# Patient Record
Sex: FEMALE | Race: WHITE | NOT HISPANIC OR LATINO | Employment: FULL TIME | URBAN - NONMETROPOLITAN AREA
[De-identification: names, ages, dates, MRNs, and addresses within clinical notes are randomized per-mention and may not be internally consistent; named-entity substitution may affect disease eponyms.]

---

## 2017-04-22 ENCOUNTER — APPOINTMENT (OUTPATIENT)
Dept: ULTRASOUND IMAGING | Facility: HOSPITAL | Age: 27
End: 2017-04-22

## 2017-04-22 ENCOUNTER — HOSPITAL ENCOUNTER (EMERGENCY)
Facility: HOSPITAL | Age: 27
Discharge: HOME | End: 2017-04-22
Attending: EMERGENCY MEDICINE | Admitting: EMERGENCY MEDICINE

## 2017-04-22 VITALS
BODY MASS INDEX: 25.78 KG/M2 | SYSTOLIC BLOOD PRESSURE: 117 MMHG | HEART RATE: 96 BPM | TEMPERATURE: 98.5 F | HEIGHT: 66 IN | WEIGHT: 160.4 LBS | OXYGEN SATURATION: 100 % | DIASTOLIC BLOOD PRESSURE: 64 MMHG | RESPIRATION RATE: 20 BRPM

## 2017-04-22 DIAGNOSIS — O20.9 BLEEDING IN EARLY PREGNANCY: Primary | ICD-10-CM

## 2017-04-22 LAB
ABO GROUP BLD: NORMAL
B-HCG UR QL: POSITIVE
BACTERIA UR QL AUTO: ABNORMAL /HPF
BILIRUB UR QL STRIP: NEGATIVE
CLARITY UR: ABNORMAL
COLOR UR: YELLOW
GLUCOSE UR STRIP-MCNC: NEGATIVE MG/DL
HGB UR QL STRIP.AUTO: ABNORMAL
HOLD SPECIMEN: NORMAL
HOLD SPECIMEN: NORMAL
HYALINE CASTS UR QL AUTO: ABNORMAL /LPF
KETONES UR QL STRIP: ABNORMAL
LEUKOCYTE ESTERASE UR QL STRIP.AUTO: NEGATIVE
NITRITE UR QL STRIP: NEGATIVE
PH UR STRIP.AUTO: 6.5 [PH] (ref 5–9)
PROT UR QL STRIP: NEGATIVE
RBC # UR: ABNORMAL /HPF
REF LAB TEST METHOD: ABNORMAL
RH BLD: POSITIVE
SP GR UR STRIP: 1.02 (ref 1–1.03)
SQUAMOUS #/AREA URNS HPF: ABNORMAL /HPF
UROBILINOGEN UR QL STRIP: ABNORMAL
WBC UR QL AUTO: ABNORMAL /HPF
WHOLE BLOOD HOLD SPECIMEN: NORMAL

## 2017-04-22 PROCEDURE — 86901 BLOOD TYPING SEROLOGIC RH(D): CPT | Performed by: EMERGENCY MEDICINE

## 2017-04-22 PROCEDURE — 81001 URINALYSIS AUTO W/SCOPE: CPT | Performed by: EMERGENCY MEDICINE

## 2017-04-22 PROCEDURE — 76801 OB US < 14 WKS SINGLE FETUS: CPT

## 2017-04-22 PROCEDURE — 87086 URINE CULTURE/COLONY COUNT: CPT | Performed by: EMERGENCY MEDICINE

## 2017-04-22 PROCEDURE — 86900 BLOOD TYPING SEROLOGIC ABO: CPT | Performed by: EMERGENCY MEDICINE

## 2017-04-22 PROCEDURE — 81025 URINE PREGNANCY TEST: CPT | Performed by: EMERGENCY MEDICINE

## 2017-04-22 PROCEDURE — 99283 EMERGENCY DEPT VISIT LOW MDM: CPT

## 2017-04-22 RX ORDER — PNV NO.95/FERROUS FUM/FOLIC AC 28MG-0.8MG
1 TABLET ORAL DAILY
Qty: 30 TABLET | Refills: 0 | Status: SHIPPED | OUTPATIENT
Start: 2017-04-22 | End: 2017-05-24 | Stop reason: SDUPTHER

## 2017-04-22 NOTE — ED PROVIDER NOTES
Subjective   Patient is a 26 y.o. female presenting with vaginal bleeding.   Vaginal Bleeding   Quality:  Spotting (Possible pregnancy, LMP mid February, now cramping/spotting/low back pain today.)  Severity:  Mild  Onset quality:  Sudden  Duration:  1 day  Timing:  Intermittent  Progression:  Partially resolved  Chronicity:  New  Menstrual history:  Missed period  Possible pregnancy: yes    Context: spontaneously    Relieved by:  Nothing  Worsened by:  Nothing  Ineffective treatments:  None tried  Associated symptoms: abdominal pain, back pain and nausea    Associated symptoms: no dizziness, no dysuria, no fatigue, no fever and no vaginal discharge    Risk factors: no hx of ectopic pregnancy, no PID and no STD        Review of Systems   Constitutional: Negative for chills, diaphoresis, fatigue and fever.   HENT: Negative for rhinorrhea and sore throat.    Eyes: Negative for pain, discharge and visual disturbance.   Respiratory: Negative for cough, chest tightness, shortness of breath and wheezing.    Cardiovascular: Negative for chest pain and leg swelling.   Gastrointestinal: Positive for abdominal pain and nausea. Negative for blood in stool, constipation, diarrhea and vomiting.   Endocrine: Negative for polydipsia and polyuria.   Genitourinary: Positive for vaginal bleeding. Negative for decreased urine volume, dysuria, flank pain, frequency, hematuria and vaginal discharge.   Musculoskeletal: Positive for back pain. Negative for myalgias.   Skin: Negative for rash.   Neurological: Negative for dizziness, syncope, speech difficulty, weakness, light-headedness and headaches.   Psychiatric/Behavioral: Negative for confusion and decreased concentration.   All other systems reviewed and are negative.      History reviewed. No pertinent past medical history.    No Known Allergies    History reviewed. No pertinent surgical history.    History reviewed. No pertinent family history.    Social History     Social History    • Marital status: Single     Spouse name: N/A   • Number of children: N/A   • Years of education: N/A     Social History Main Topics   • Smoking status: Former Smoker     Quit date: 4/20/2017   • Smokeless tobacco: None   • Alcohol use No   • Drug use: No   • Sexual activity: Not Asked     Other Topics Concern   • None     Social History Narrative   • None           Objective   Physical Exam   Constitutional: She appears well-developed and well-nourished.  Non-toxic appearance.   HENT:   Head: Normocephalic and atraumatic.   Nose: Nose normal.   Mouth/Throat: Oropharynx is clear and moist.   Eyes: Conjunctivae, EOM and lids are normal.   Neck: Neck supple. No tracheal deviation present.   Cardiovascular: Normal rate, regular rhythm, normal heart sounds and intact distal pulses.    No murmur heard.  Pulmonary/Chest: Effort normal and breath sounds normal. No stridor. No tachypnea. No respiratory distress. She has no wheezes. She has no rales.   Abdominal: Soft. Bowel sounds are normal. She exhibits no distension and no mass. There is no tenderness. There is no rebound and no guarding.   Musculoskeletal: She exhibits no edema.   Neurological: She is alert. No cranial nerve deficit. She exhibits normal muscle tone. Coordination normal.   Skin: Skin is warm and dry. No rash noted. No pallor.   Psychiatric: She has a normal mood and affect. Her behavior is normal. Judgment and thought content normal.   Nursing note and vitals reviewed.      Procedures         ED Course  ED Course                  MDM    Final diagnoses:   Bleeding in early pregnancy            Miguel Farmer MD  04/23/17 0140

## 2017-04-22 NOTE — ED NOTES
Pt presents to the ED with mild vaginal bleeding that started this morning.  Pt reports lower back pain and lower abdominal pain.  Pt reports that she found out last week that she was pregnant but unsure of how many weeks she was. Last normal period was sometime in February.      Latanya Elizondo RN  04/22/17 9025

## 2017-04-23 LAB — BACTERIA SPEC AEROBE CULT: NORMAL

## 2017-05-02 ENCOUNTER — APPOINTMENT (OUTPATIENT)
Dept: LAB | Facility: HOSPITAL | Age: 27
End: 2017-05-02

## 2017-05-02 ENCOUNTER — INITIAL PRENATAL (OUTPATIENT)
Dept: OBSTETRICS AND GYNECOLOGY | Facility: CLINIC | Age: 27
End: 2017-05-02

## 2017-05-02 VITALS — SYSTOLIC BLOOD PRESSURE: 125 MMHG | WEIGHT: 157 LBS | BODY MASS INDEX: 25.34 KG/M2 | DIASTOLIC BLOOD PRESSURE: 80 MMHG

## 2017-05-02 DIAGNOSIS — O20.9 BLEEDING IN EARLY PREGNANCY: Primary | ICD-10-CM

## 2017-05-02 DIAGNOSIS — Z3A.09 9 WEEKS GESTATION OF PREGNANCY: ICD-10-CM

## 2017-05-02 LAB
ABO GROUP BLD: NORMAL
BASOPHILS # BLD AUTO: 0.03 10*3/MM3 (ref 0–0.2)
BASOPHILS NFR BLD AUTO: 0.3 % (ref 0–2)
BLD GP AB SCN SERPL QL: NEGATIVE
CANDIDA ALBICANS: NEGATIVE
DEPRECATED RDW RBC AUTO: 36 FL (ref 36.4–46.3)
EOSINOPHIL # BLD AUTO: 0.14 10*3/MM3 (ref 0–0.7)
EOSINOPHIL NFR BLD AUTO: 1.5 % (ref 0–7)
ERYTHROCYTE [DISTWIDTH] IN BLOOD BY AUTOMATED COUNT: 11.7 % (ref 11.5–14.5)
GARDNERELLA VAGINALIS: NEGATIVE
HBV SURFACE AG SERPL QL IA: NEGATIVE
HCT VFR BLD AUTO: 37.9 % (ref 35–45)
HCV AB SER DONR QL: NEGATIVE
HGB BLD-MCNC: 13.5 G/DL (ref 12–15.5)
HIV1+2 AB SER QL: NEGATIVE
IMM GRANULOCYTES # BLD: 0.02 10*3/MM3 (ref 0–0.02)
IMM GRANULOCYTES NFR BLD: 0.2 % (ref 0–0.5)
LYMPHOCYTES # BLD AUTO: 1.48 10*3/MM3 (ref 0.6–4.2)
LYMPHOCYTES NFR BLD AUTO: 16.1 % (ref 10–50)
MCH RBC QN AUTO: 30 PG (ref 26.5–34)
MCHC RBC AUTO-ENTMCNC: 35.6 G/DL (ref 31.4–36)
MCV RBC AUTO: 84.2 FL (ref 80–98)
MONOCYTES # BLD AUTO: 0.38 10*3/MM3 (ref 0–0.9)
MONOCYTES NFR BLD AUTO: 4.1 % (ref 0–12)
NEUTROPHILS # BLD AUTO: 7.14 10*3/MM3 (ref 2–8.6)
NEUTROPHILS NFR BLD AUTO: 77.8 % (ref 37–80)
PLATELET # BLD AUTO: 185 10*3/MM3 (ref 150–450)
PMV BLD AUTO: 11.7 FL (ref 8–12)
RBC # BLD AUTO: 4.5 10*6/MM3 (ref 3.77–5.16)
RH BLD: POSITIVE
RUBV IGG SER QL: ABNORMAL
RUBV IGG SER-ACNC: 172 IU/ML (ref 0–9.9)
TRICHOMONAS VAGINALIS PCR: NEGATIVE
WBC NRBC COR # BLD: 9.19 10*3/MM3 (ref 3.2–9.8)

## 2017-05-02 PROCEDURE — 86850 RBC ANTIBODY SCREEN: CPT | Performed by: ADVANCED PRACTICE MIDWIFE

## 2017-05-02 PROCEDURE — 86900 BLOOD TYPING SEROLOGIC ABO: CPT | Performed by: ADVANCED PRACTICE MIDWIFE

## 2017-05-02 PROCEDURE — G0432 EIA HIV-1/HIV-2 SCREEN: HCPCS | Performed by: ADVANCED PRACTICE MIDWIFE

## 2017-05-02 PROCEDURE — 86901 BLOOD TYPING SEROLOGIC RH(D): CPT | Performed by: ADVANCED PRACTICE MIDWIFE

## 2017-05-02 PROCEDURE — 86803 HEPATITIS C AB TEST: CPT | Performed by: ADVANCED PRACTICE MIDWIFE

## 2017-05-02 PROCEDURE — 87660 TRICHOMONAS VAGIN DIR PROBE: CPT | Performed by: ADVANCED PRACTICE MIDWIFE

## 2017-05-02 PROCEDURE — 0501F PRENATAL FLOW SHEET: CPT | Performed by: ADVANCED PRACTICE MIDWIFE

## 2017-05-02 PROCEDURE — 87480 CANDIDA DNA DIR PROBE: CPT | Performed by: ADVANCED PRACTICE MIDWIFE

## 2017-05-02 PROCEDURE — 87510 GARDNER VAG DNA DIR PROBE: CPT | Performed by: ADVANCED PRACTICE MIDWIFE

## 2017-05-02 PROCEDURE — 36415 COLL VENOUS BLD VENIPUNCTURE: CPT | Performed by: ADVANCED PRACTICE MIDWIFE

## 2017-05-02 PROCEDURE — 85025 COMPLETE CBC W/AUTO DIFF WBC: CPT | Performed by: ADVANCED PRACTICE MIDWIFE

## 2017-05-02 PROCEDURE — 87340 HEPATITIS B SURFACE AG IA: CPT | Performed by: ADVANCED PRACTICE MIDWIFE

## 2017-05-03 PROBLEM — B00.9 HERPES: Status: ACTIVE | Noted: 2017-05-03

## 2017-05-03 LAB — Lab: NORMAL

## 2017-05-08 LAB — RPR SER QL: NORMAL

## 2017-05-24 RX ORDER — PNV NO.95/FERROUS FUM/FOLIC AC 28MG-0.8MG
1 TABLET ORAL DAILY
Qty: 30 TABLET | Refills: 12 | Status: SHIPPED | OUTPATIENT
Start: 2017-05-24 | End: 2017-11-30 | Stop reason: HOSPADM

## 2017-05-30 ENCOUNTER — ROUTINE PRENATAL (OUTPATIENT)
Dept: OBSTETRICS AND GYNECOLOGY | Facility: CLINIC | Age: 27
End: 2017-05-30

## 2017-05-30 ENCOUNTER — APPOINTMENT (OUTPATIENT)
Dept: LAB | Facility: HOSPITAL | Age: 27
End: 2017-05-30

## 2017-05-30 VITALS — DIASTOLIC BLOOD PRESSURE: 72 MMHG | BODY MASS INDEX: 25.34 KG/M2 | SYSTOLIC BLOOD PRESSURE: 112 MMHG | WEIGHT: 157 LBS

## 2017-05-30 DIAGNOSIS — Z3A.13 13 WEEKS GESTATION OF PREGNANCY: ICD-10-CM

## 2017-05-30 DIAGNOSIS — Z34.01 ENCOUNTER FOR SUPERVISION OF NORMAL FIRST PREGNANCY IN FIRST TRIMESTER: Primary | ICD-10-CM

## 2017-05-30 LAB
AMPHET+METHAMPHET UR QL: NEGATIVE
BACTERIA UR QL AUTO: ABNORMAL /HPF
BARBITURATES UR QL SCN: NEGATIVE
BENZODIAZ UR QL SCN: NEGATIVE
BILIRUB UR QL STRIP: NEGATIVE
CANNABINOIDS SERPL QL: NEGATIVE
CLARITY UR: ABNORMAL
COCAINE UR QL: NEGATIVE
COLOR UR: ABNORMAL
GLUCOSE UR STRIP-MCNC: NEGATIVE MG/DL
HGB UR QL STRIP.AUTO: NEGATIVE
HYALINE CASTS UR QL AUTO: ABNORMAL /LPF
KETONES UR QL STRIP: NEGATIVE
LEUKOCYTE ESTERASE UR QL STRIP.AUTO: ABNORMAL
METHADONE UR QL SCN: NEGATIVE
NITRITE UR QL STRIP: NEGATIVE
OPIATES UR QL: NEGATIVE
OXYCODONE UR QL SCN: NEGATIVE
PH UR STRIP.AUTO: 6 [PH] (ref 5–9)
PROT UR QL STRIP: NEGATIVE
RBC # UR: ABNORMAL /HPF
REF LAB TEST METHOD: ABNORMAL
SP GR UR STRIP: 1.02 (ref 1–1.03)
SQUAMOUS #/AREA URNS HPF: ABNORMAL /HPF
UROBILINOGEN UR QL STRIP: ABNORMAL
WBC UR QL AUTO: ABNORMAL /HPF
YEAST URNS QL MICRO: ABNORMAL /HPF

## 2017-05-30 PROCEDURE — 80307 DRUG TEST PRSMV CHEM ANLYZR: CPT | Performed by: ADVANCED PRACTICE MIDWIFE

## 2017-05-30 PROCEDURE — 87591 N.GONORRHOEAE DNA AMP PROB: CPT | Performed by: ADVANCED PRACTICE MIDWIFE

## 2017-05-30 PROCEDURE — 0502F SUBSEQUENT PRENATAL CARE: CPT | Performed by: ADVANCED PRACTICE MIDWIFE

## 2017-05-30 PROCEDURE — 87491 CHLMYD TRACH DNA AMP PROBE: CPT | Performed by: ADVANCED PRACTICE MIDWIFE

## 2017-05-30 PROCEDURE — 87086 URINE CULTURE/COLONY COUNT: CPT | Performed by: ADVANCED PRACTICE MIDWIFE

## 2017-05-30 PROCEDURE — 81001 URINALYSIS AUTO W/SCOPE: CPT | Performed by: ADVANCED PRACTICE MIDWIFE

## 2017-05-31 PROBLEM — Z34.01 ENCOUNTER FOR SUPERVISION OF NORMAL FIRST PREGNANCY IN FIRST TRIMESTER: Status: ACTIVE | Noted: 2017-05-31

## 2017-05-31 LAB
BACTERIA SPEC AEROBE CULT: NORMAL
C TRACH RRNA CVX QL NAA+PROBE: NOT DETECTED
N GONORRHOEA RRNA SPEC QL NAA+PROBE: NOT DETECTED

## 2017-06-22 ENCOUNTER — ROUTINE PRENATAL (OUTPATIENT)
Dept: OBSTETRICS AND GYNECOLOGY | Facility: CLINIC | Age: 27
End: 2017-06-22

## 2017-06-22 VITALS — BODY MASS INDEX: 27.12 KG/M2 | WEIGHT: 168 LBS | SYSTOLIC BLOOD PRESSURE: 113 MMHG | DIASTOLIC BLOOD PRESSURE: 81 MMHG

## 2017-06-22 DIAGNOSIS — Z3A.16 16 WEEKS GESTATION OF PREGNANCY: ICD-10-CM

## 2017-06-22 DIAGNOSIS — Z36.89 ENCOUNTER FOR ROUTINE FETAL ULTRASOUND: ICD-10-CM

## 2017-06-22 DIAGNOSIS — Z34.02 ENCOUNTER FOR SUPERVISION OF NORMAL FIRST PREGNANCY IN SECOND TRIMESTER: Primary | ICD-10-CM

## 2017-06-22 PROCEDURE — 0502F SUBSEQUENT PRENATAL CARE: CPT | Performed by: ADVANCED PRACTICE MIDWIFE

## 2017-06-24 PROBLEM — Z34.02 ENCOUNTER FOR SUPERVISION OF NORMAL FIRST PREGNANCY IN SECOND TRIMESTER: Status: ACTIVE | Noted: 2017-05-31

## 2017-06-24 NOTE — PROGRESS NOTES
Next visit: anatomy scan  Patient presents for CNT #1. ROS: reports headache occasionally. Denies spotting, dysuria, vomiting.    Educated on importance of hydration. Class reviewed overview of prenatal care and nutrition.     Patient was offered genetic screening today. Educated patient that this is not a diagnostic test. Therefore, a positive result does not indicate the fetus has the condition resulted, only that there is an increased risk. Discussed referral to Maternal Fetal Medicine of her choosing if result is positive. States understanding.     After considering the options previously presented, she is interested in having genetic testing performed.

## 2017-07-20 ENCOUNTER — APPOINTMENT (OUTPATIENT)
Dept: LAB | Facility: HOSPITAL | Age: 27
End: 2017-07-20

## 2017-07-20 ENCOUNTER — ROUTINE PRENATAL (OUTPATIENT)
Dept: OBSTETRICS AND GYNECOLOGY | Facility: CLINIC | Age: 27
End: 2017-07-20

## 2017-07-20 VITALS — BODY MASS INDEX: 27.76 KG/M2 | DIASTOLIC BLOOD PRESSURE: 86 MMHG | WEIGHT: 172 LBS | SYSTOLIC BLOOD PRESSURE: 123 MMHG

## 2017-07-20 DIAGNOSIS — Z3A.20 20 WEEKS GESTATION OF PREGNANCY: ICD-10-CM

## 2017-07-20 DIAGNOSIS — Z34.02 ENCOUNTER FOR SUPERVISION OF NORMAL FIRST PREGNANCY IN SECOND TRIMESTER: Primary | ICD-10-CM

## 2017-07-20 PROCEDURE — 36415 COLL VENOUS BLD VENIPUNCTURE: CPT

## 2017-07-20 PROCEDURE — 81220 CFTR GENE COM VARIANTS: CPT | Performed by: ADVANCED PRACTICE MIDWIFE

## 2017-07-20 PROCEDURE — 82677 ASSAY OF ESTRIOL: CPT | Performed by: ADVANCED PRACTICE MIDWIFE

## 2017-07-20 PROCEDURE — 82105 ALPHA-FETOPROTEIN SERUM: CPT | Performed by: ADVANCED PRACTICE MIDWIFE

## 2017-07-20 PROCEDURE — 84702 CHORIONIC GONADOTROPIN TEST: CPT | Performed by: ADVANCED PRACTICE MIDWIFE

## 2017-07-20 PROCEDURE — 99212 OFFICE O/P EST SF 10 MIN: CPT | Performed by: ADVANCED PRACTICE MIDWIFE

## 2017-07-20 PROCEDURE — 86336 INHIBIN A: CPT | Performed by: ADVANCED PRACTICE MIDWIFE

## 2017-07-20 NOTE — PROGRESS NOTES
CC: BRANDEN visit with anatomy scan, history reviewed see history tabs.  Did not collect genetic screening last visit but desires to do so today.    ROS: Negative headache, low back pain, vaginal bleeding and dysuria    Ultrasound: EFW 13 oz, BRAXTON 8.9cm, , cephalic, posterior placenta, 3 vessel cord, anatomy within normal limits, female.    Educated on: ultrasound report    Plan: f/u in 4 weeks

## 2017-07-25 LAB
2ND TRIMESTER 4 SCREEN SERPL-IMP: ABNORMAL
AFP ADJ MOM SERPL: 0.64
AFP INTERP SERPL-IMP: ABNORMAL
AFP SERPL-MCNC: 36.6 NG/ML
AGE AT DELIVERY: 26.2 YEARS
FET TS 18 RISK FROM MAT AGE: ABNORMAL
FET TS 21 RISK FROM MAT AGE: 971
GA METHOD: ABNORMAL
GA: 20.7 WEEKS
HCG ADJ MOM SERPL: 2.69
HCG SERPL-ACNC: ABNORMAL MIU/ML
IDDM PATIENT QL: NO
INHIBIN A ADJ MOM SERPL: 2.29
INHIBIN A SERPL-MCNC: 445.66 PG/ML
LABORATORY COMMENT REPORT: ABNORMAL
MULTIPLE PREGNANCY: NO
NEURAL TUBE DEFECT RISK FETUS: ABNORMAL %
RESULT: ABNORMAL
TS 18 RISK FETUS: ABNORMAL
TS 21 RISK FETUS: 71
U ESTRIOL ADJ MOM SERPL: 1.17
U ESTRIOL SERPL-MCNC: 2.32 NG/ML

## 2017-07-28 DIAGNOSIS — O28.5 ABNORMAL GENETIC TEST DURING PREGNANCY: Primary | ICD-10-CM

## 2017-07-28 LAB
CFTR MUT ANL BLD/T: NORMAL
CONV COMMENT: NORMAL

## 2017-08-01 DIAGNOSIS — O28.0 ABNORMAL QUAD SCREEN: Primary | ICD-10-CM

## 2017-08-09 ENCOUNTER — ROUTINE PRENATAL (OUTPATIENT)
Dept: OBSTETRICS AND GYNECOLOGY | Facility: CLINIC | Age: 27
End: 2017-08-09

## 2017-08-09 ENCOUNTER — CONSULT (OUTPATIENT)
Dept: OBSTETRICS AND GYNECOLOGY | Facility: CLINIC | Age: 27
End: 2017-08-09

## 2017-08-09 VITALS — SYSTOLIC BLOOD PRESSURE: 116 MMHG | BODY MASS INDEX: 29.05 KG/M2 | DIASTOLIC BLOOD PRESSURE: 80 MMHG | WEIGHT: 180 LBS

## 2017-08-09 DIAGNOSIS — Z36.9 ANTENATAL SCREENING ENCOUNTER: ICD-10-CM

## 2017-08-09 DIAGNOSIS — B00.9 HERPES: ICD-10-CM

## 2017-08-09 DIAGNOSIS — O28.0 ABNORMAL QUAD SCREEN: Primary | ICD-10-CM

## 2017-08-09 DIAGNOSIS — O28.0 ABNORMAL ANTENATAL AFP SCREEN: Primary | ICD-10-CM

## 2017-08-09 PROCEDURE — 99212 OFFICE O/P EST SF 10 MIN: CPT | Performed by: ADVANCED PRACTICE MIDWIFE

## 2017-08-09 NOTE — PROGRESS NOTES
CC: BRANDEN visit, history reviewed see history tabs.     ROS: Negative leaking fluid from the vagina, swelling in her legs, headache, visual changes, low back pain and heartburn      Educated on:1 hour glucose, reviewed US findings- normal. [facility] offered maternity T21 and amnio and patient declined     A/Plan: f/u in 1 week/s   Marlene was seen today for routine prenatal visit.    Diagnoses and all orders for this visit:     screening encounter  -     CBC & Differential; Future  -     Glucose, Post 50 Gm Glucola; Future    Needs f/u us at 32 weeks- abnormal quad

## 2017-08-10 PROBLEM — O28.0 ABNORMAL QUAD SCREEN: Status: ACTIVE | Noted: 2017-08-10

## 2017-08-10 NOTE — PROGRESS NOTES
Consultation requested on patient by referring doctor.    Thank you for requesting our service to provide consultation to your patient given her abnormal maternal serum screening test which indicated an increased risk for trisomy 21 syndrome. We discussed that maternal serum AFP (alpha-fetoprotein), uE3 (unconjugated estriol), and HCG (human chorionic gonadotropin) screening is a test for neural tube defects and some chromosome abnormalities such as trisomy 21 and trisomy 18 syndrome.  The patient's maternal serum screening result in combination with her age has increased the risk for trisomy 21 to 1 in 71.  The test detects about 80% of fetuses with trisomy 21 with a false positive rate of 6%.  The patient has been offered the option of pursuing amniocentesis should she wish to learn the fetal chromosome complement.  Amniocentesis has a 1 in 500 risk of associated miscarriage. The chromosome result is 99.5% accurate and requires approximately 7-10 days to return.  Amniotic fluid AFP measurement also detects 95% of open neural tube defects.  After reviewing the risks, benefits and limitations of amniocentesis, the patient decided not to have an amniocentesis.  I then discussed the option of analysis of cell-free fetal DNA in maternal blood.  I noted this directed analysis measures the relative proportion of chromosomes with the detection rate of fetal Down syndrome quoted as 99% with a false positive rate of 0.1%.      The ultrasound showed a normal appearing fetus.  None of the commonly seen anomalies of trisomy 21 syndrome (increased nuchal fold measurement, proximal extremity shortening or congenital heart defects) were seen (please see above complete ultrasound description).  She has been informed that these ultrasound findings reduce the fetal risk for trisomy 21 by 60% but cannot completely exclude chromosome anomalies.  She understands the counseling provided and again declines amniocentesis.  I have  recommended a follow-up ultrasound at 32 weeks' gestation for the later ultrasonographic findings of fetal aneuploidy.      After careful consideration, patient declines all genetic testing.    I spent a total time of  15 minutes with this patient of which greater than 50% was face to face counseling and coordination of care.  Questions asked by the patient were answered.

## 2017-08-17 ENCOUNTER — ROUTINE PRENATAL (OUTPATIENT)
Dept: OBSTETRICS AND GYNECOLOGY | Facility: CLINIC | Age: 27
End: 2017-08-17

## 2017-08-17 VITALS — WEIGHT: 186 LBS | DIASTOLIC BLOOD PRESSURE: 69 MMHG | SYSTOLIC BLOOD PRESSURE: 104 MMHG | BODY MASS INDEX: 30.02 KG/M2

## 2017-08-17 DIAGNOSIS — Z34.02 ENCOUNTER FOR SUPERVISION OF NORMAL FIRST PREGNANCY IN SECOND TRIMESTER: Primary | ICD-10-CM

## 2017-08-17 DIAGNOSIS — O28.0 ABNORMAL QUAD SCREEN: ICD-10-CM

## 2017-08-17 DIAGNOSIS — B00.9 HERPES: ICD-10-CM

## 2017-08-17 DIAGNOSIS — Z3A.24 24 WEEKS GESTATION OF PREGNANCY: ICD-10-CM

## 2017-08-17 NOTE — PROGRESS NOTES
CC: BRANDEN visit, history reviewed see history tabs.     ROS:   Negative leaking fluid from the vagina, swelling in her legs, headache, visual changes, low back pain and heartburn      Educated on:1 hour glucose test     A/Plan: f/u in 4 week/s   Marlene was seen today for routine prenatal visit.    Diagnoses and all orders for this visit:    Encounter for supervision of normal first pregnancy in second trimester  -     Glucose, Post 50 Gm Glucola; Future  -     CBC (No Diff); Future    Abnormal quad screen  -     Glucose, Post 50 Gm Glucola; Future  -     CBC (No Diff); Future    Herpes  -     Glucose, Post 50 Gm Glucola; Future  -     CBC (No Diff); Future    24 weeks gestation of pregnancy  -     Glucose, Post 50 Gm Glucola; Future  -     CBC (No Diff); Future

## 2017-08-22 ENCOUNTER — RESULTS ENCOUNTER (OUTPATIENT)
Dept: OBSTETRICS AND GYNECOLOGY | Facility: CLINIC | Age: 27
End: 2017-08-22

## 2017-08-22 DIAGNOSIS — Z34.02 ENCOUNTER FOR SUPERVISION OF NORMAL FIRST PREGNANCY IN SECOND TRIMESTER: ICD-10-CM

## 2017-08-22 DIAGNOSIS — Z3A.24 24 WEEKS GESTATION OF PREGNANCY: ICD-10-CM

## 2017-08-22 DIAGNOSIS — O28.0 ABNORMAL QUAD SCREEN: ICD-10-CM

## 2017-08-22 DIAGNOSIS — B00.9 HERPES: ICD-10-CM

## 2017-09-06 ENCOUNTER — RESULTS ENCOUNTER (OUTPATIENT)
Dept: OBSTETRICS AND GYNECOLOGY | Facility: CLINIC | Age: 27
End: 2017-09-06

## 2017-09-06 DIAGNOSIS — Z36.9 ANTENATAL SCREENING ENCOUNTER: ICD-10-CM

## 2017-09-14 ENCOUNTER — ROUTINE PRENATAL (OUTPATIENT)
Dept: OBSTETRICS AND GYNECOLOGY | Facility: CLINIC | Age: 27
End: 2017-09-14

## 2017-09-14 ENCOUNTER — LAB (OUTPATIENT)
Dept: LAB | Facility: HOSPITAL | Age: 27
End: 2017-09-14

## 2017-09-14 VITALS — DIASTOLIC BLOOD PRESSURE: 67 MMHG | WEIGHT: 190 LBS | SYSTOLIC BLOOD PRESSURE: 114 MMHG | BODY MASS INDEX: 30.67 KG/M2

## 2017-09-14 DIAGNOSIS — B00.9 HERPES: ICD-10-CM

## 2017-09-14 DIAGNOSIS — Z3A.28 28 WEEKS GESTATION OF PREGNANCY: ICD-10-CM

## 2017-09-14 DIAGNOSIS — O28.0 ABNORMAL QUAD SCREEN: Primary | ICD-10-CM

## 2017-09-14 DIAGNOSIS — O28.0 ABNORMAL QUAD SCREEN: ICD-10-CM

## 2017-09-14 DIAGNOSIS — Z34.02 ENCOUNTER FOR SUPERVISION OF NORMAL FIRST PREGNANCY IN SECOND TRIMESTER: ICD-10-CM

## 2017-09-14 DIAGNOSIS — Z3A.24 24 WEEKS GESTATION OF PREGNANCY: ICD-10-CM

## 2017-09-14 DIAGNOSIS — Z34.03 ENCOUNTER FOR SUPERVISION OF NORMAL FIRST PREGNANCY IN THIRD TRIMESTER: ICD-10-CM

## 2017-09-14 LAB
DEPRECATED RDW RBC AUTO: 39.6 FL (ref 36.4–46.3)
ERYTHROCYTE [DISTWIDTH] IN BLOOD BY AUTOMATED COUNT: 12.5 % (ref 11.5–14.5)
GLUCOSE 1H P 100 G GLC PO SERPL-MCNC: 129 MG/DL (ref 60–140)
HCT VFR BLD AUTO: 32.6 % (ref 35–45)
HGB BLD-MCNC: 11.2 G/DL (ref 12–15.5)
MCH RBC QN AUTO: 29.9 PG (ref 26.5–34)
MCHC RBC AUTO-ENTMCNC: 34.4 G/DL (ref 31.4–36)
MCV RBC AUTO: 86.9 FL (ref 80–98)
PLATELET # BLD AUTO: 169 10*3/MM3 (ref 150–450)
PMV BLD AUTO: 10.8 FL (ref 8–12)
RBC # BLD AUTO: 3.75 10*6/MM3 (ref 3.77–5.16)
WBC NRBC COR # BLD: 10.39 10*3/MM3 (ref 3.2–9.8)

## 2017-09-14 PROCEDURE — 36415 COLL VENOUS BLD VENIPUNCTURE: CPT | Performed by: ADVANCED PRACTICE MIDWIFE

## 2017-09-14 PROCEDURE — 82950 GLUCOSE TEST: CPT | Performed by: ADVANCED PRACTICE MIDWIFE

## 2017-09-14 PROCEDURE — 99212 OFFICE O/P EST SF 10 MIN: CPT | Performed by: ADVANCED PRACTICE MIDWIFE

## 2017-09-14 PROCEDURE — 85027 COMPLETE CBC AUTOMATED: CPT | Performed by: ADVANCED PRACTICE MIDWIFE

## 2017-09-14 NOTE — PROGRESS NOTES
Next: schedule US for 32 weeks   CC: BRANDEN visit, history reviewed see history tabs.     ROS: Negative leaking fluid from the vagina, swelling in her legs, headache, visual changes, low back pain and heartburn      Educated on:Centering 1, nutrition, FKC, selecting pediatrician, and birth control    A/Plan: f/u in 2 week/s   Marlene was seen today for routine prenatal visit.    Diagnoses and all orders for this visit:    Herpes    Encounter for supervision of normal first pregnancy in third trimester    Abnormal quad screen    28 weeks gestation of pregnancy

## 2017-09-20 ENCOUNTER — HOSPITAL ENCOUNTER (OUTPATIENT)
Dept: ULTRASOUND IMAGING | Facility: HOSPITAL | Age: 27
Discharge: HOME | End: 2017-09-20
Admitting: NURSE PRACTITIONER

## 2017-09-20 PROCEDURE — 76882 US LMTD JT/FCL EVL NVASC XTR: CPT

## 2017-09-24 ENCOUNTER — HOSPITAL ENCOUNTER (OUTPATIENT)
Facility: HOSPITAL | Age: 27
Discharge: HOME | End: 2017-09-24
Attending: OBSTETRICS & GYNECOLOGY | Admitting: OBSTETRICS & GYNECOLOGY

## 2017-09-24 VITALS
TEMPERATURE: 98.1 F | WEIGHT: 191 LBS | HEIGHT: 66 IN | DIASTOLIC BLOOD PRESSURE: 73 MMHG | RESPIRATION RATE: 18 BRPM | OXYGEN SATURATION: 98 % | HEART RATE: 109 BPM | BODY MASS INDEX: 30.7 KG/M2 | SYSTOLIC BLOOD PRESSURE: 129 MMHG

## 2017-09-24 PROCEDURE — G0463 HOSPITAL OUTPT CLINIC VISIT: HCPCS

## 2017-09-24 PROCEDURE — 59025 FETAL NON-STRESS TEST: CPT

## 2017-09-24 NOTE — PROGRESS NOTES
[facility]  Marlene Barbosa  : 1990  MRN: [id number]  CSN: [id number]    Labor progress note    She c/o vaginal bleeding.  There were spots on her underpants earlier in the day.  It has now stopped.  She also felt crampy earlier in the day but now it is better as well.  She was at work today.  Had intercourse last night.  Baby is moving normally.    Min/max vitals past 24 hours:  Temp  Min: 98.1 °F (36.7 °C)  Max: 98.1 °F (36.7 °C)   BP  Min: 129/73  Max: 129/73   Pulse  Min: 109  Max: 109   Resp  Min: 18  Max: 18        FHT's: moderate variability with accelerations   Cervix: was not examined   Contractions: none         Plan   1. Postcoital bleeding.  Discussed precautions for return to the hospital.  Follow up as scheduled.    Sara Palma MD  2017  4:57 PM

## 2017-09-24 NOTE — NON STRESS TEST
Marlene Barbosa, a  at 30w1d with an MELY of 2017, by Ultrasound, was seen at [facility] for a nonstress test.    Chief Complaint   Patient presents with   • Vaginal Bleeding     bright red streaking when going to bathroom this AM.   • Abdominal Cramping     since this AM.        Interpretation A  Nonstress Test Interpretation A: Reactive (17 1645 : Jean Carlos Syed RN)  Comments A: Alize ALONZO  (175 : Jean Carlos Syed, [place])

## 2017-09-28 ENCOUNTER — OFFICE VISIT (OUTPATIENT)
Dept: SURGERY | Facility: CLINIC | Age: 27
End: 2017-09-28

## 2017-09-28 ENCOUNTER — ROUTINE PRENATAL (OUTPATIENT)
Dept: OBSTETRICS AND GYNECOLOGY | Facility: CLINIC | Age: 27
End: 2017-09-28

## 2017-09-28 VITALS
WEIGHT: 200 LBS | DIASTOLIC BLOOD PRESSURE: 62 MMHG | HEIGHT: 66 IN | BODY MASS INDEX: 32.14 KG/M2 | SYSTOLIC BLOOD PRESSURE: 118 MMHG

## 2017-09-28 VITALS — DIASTOLIC BLOOD PRESSURE: 78 MMHG | BODY MASS INDEX: 31.64 KG/M2 | SYSTOLIC BLOOD PRESSURE: 113 MMHG | WEIGHT: 196 LBS

## 2017-09-28 DIAGNOSIS — Z3A.30 30 WEEKS GESTATION OF PREGNANCY: ICD-10-CM

## 2017-09-28 DIAGNOSIS — O28.0 ABNORMAL QUAD SCREEN: Primary | ICD-10-CM

## 2017-09-28 DIAGNOSIS — S50.851A FOREIGN BODY IN FOREARM, RIGHT, INITIAL ENCOUNTER: Primary | ICD-10-CM

## 2017-09-28 DIAGNOSIS — B00.9 HERPES: ICD-10-CM

## 2017-09-28 PROBLEM — S50.859A FOREIGN BODY IN FOREARM: Status: ACTIVE | Noted: 2017-09-28

## 2017-09-28 PROCEDURE — 99212 OFFICE O/P EST SF 10 MIN: CPT | Performed by: ADVANCED PRACTICE MIDWIFE

## 2017-09-28 PROCEDURE — 99243 OFF/OP CNSLTJ NEW/EST LOW 30: CPT | Performed by: SURGERY

## 2017-09-28 NOTE — PROGRESS NOTES
CC: BRANDEN visit, history reviewed see history tabs.     ROS: Negative leaking fluid from the vagina, swelling in her legs, headache, visual changes, low back pain and heartburn      Educated on:Centering #5, labor s/s, FKC, labor stages, when to go to the hospital. Reviewed labs    A/Plan: f/u in 1 week/s, US schedules As soon as there's an opening  Marlene was seen today for routine prenatal visit.    Diagnoses and all orders for this visit:    Abnormal quad screen  -     US Ob Follow Up Transabdominal Approach; Future    Herpes    30 weeks gestation of pregnancy
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
## 2017-10-23 ENCOUNTER — ROUTINE PRENATAL (OUTPATIENT)
Dept: OBSTETRICS AND GYNECOLOGY | Facility: CLINIC | Age: 27
End: 2017-10-23

## 2017-10-23 VITALS — WEIGHT: 200 LBS | BODY MASS INDEX: 32.28 KG/M2 | SYSTOLIC BLOOD PRESSURE: 117 MMHG | DIASTOLIC BLOOD PRESSURE: 78 MMHG

## 2017-10-23 DIAGNOSIS — O28.0 ABNORMAL QUAD SCREEN: ICD-10-CM

## 2017-10-23 DIAGNOSIS — B00.9 HERPES: ICD-10-CM

## 2017-10-23 DIAGNOSIS — Z34.03 ENCOUNTER FOR SUPERVISION OF NORMAL FIRST PREGNANCY IN THIRD TRIMESTER: Primary | ICD-10-CM

## 2017-10-23 PROCEDURE — 99212 OFFICE O/P EST SF 10 MIN: CPT | Performed by: ADVANCED PRACTICE MIDWIFE

## 2017-10-23 RX ORDER — VALACYCLOVIR HYDROCHLORIDE 500 MG/1
500 TABLET, FILM COATED ORAL DAILY
Qty: 30 TABLET | Refills: 1 | Status: SHIPPED | OUTPATIENT
Start: 2017-10-23 | End: 2017-11-22

## 2017-10-23 NOTE — PROGRESS NOTES
CC: BRANDEN visit, history reviewed see history tabs.     ROS: Negative leaking fluid from the vagina, swelling in her legs, headache, visual changes, low back pain and heartburn      Educated on:s/s of  labor, [facility], educated on prophylaxis for hx of herpes    A/Plan: ab Rosario was seen today for routine prenatal visit.    Diagnoses and all orders for this visit:    Encounter for supervision of normal first pregnancy in third trimester    Abnormal quad screen    Herpes    Other orders  -     valACYclovir (VALTREX) 500 MG tablet; Take 1 tablet by mouth Daily for 30 days.

## 2017-10-25 ENCOUNTER — HOSPITAL ENCOUNTER (OUTPATIENT)
Facility: HOSPITAL | Age: 27
Discharge: HOME | End: 2017-10-25
Attending: OBSTETRICS & GYNECOLOGY | Admitting: OBSTETRICS & GYNECOLOGY

## 2017-10-25 VITALS
HEIGHT: 66 IN | HEART RATE: 104 BPM | OXYGEN SATURATION: 97 % | BODY MASS INDEX: 32.14 KG/M2 | SYSTOLIC BLOOD PRESSURE: 138 MMHG | RESPIRATION RATE: 18 BRPM | DIASTOLIC BLOOD PRESSURE: 89 MMHG | WEIGHT: 200 LBS | TEMPERATURE: 98 F

## 2017-10-25 LAB
BACTERIA UR QL AUTO: ABNORMAL /HPF
BILIRUB UR QL STRIP: NEGATIVE
CLARITY UR: ABNORMAL
COLOR UR: YELLOW
GLUCOSE UR STRIP-MCNC: NEGATIVE MG/DL
HGB UR QL STRIP.AUTO: ABNORMAL
HYALINE CASTS UR QL AUTO: ABNORMAL /LPF
KETONES UR QL STRIP: NEGATIVE
LEUKOCYTE ESTERASE UR QL STRIP.AUTO: ABNORMAL
NITRITE UR QL STRIP: NEGATIVE
PH UR STRIP.AUTO: 7 [PH] (ref 5–9)
PROT UR QL STRIP: NEGATIVE
RBC # UR: ABNORMAL /HPF
REF LAB TEST METHOD: ABNORMAL
SP GR UR STRIP: 1 (ref 1–1.03)
SQUAMOUS #/AREA URNS HPF: ABNORMAL /HPF
UROBILINOGEN UR QL STRIP: ABNORMAL
WBC UR QL AUTO: ABNORMAL /HPF

## 2017-10-25 PROCEDURE — 87086 URINE CULTURE/COLONY COUNT: CPT | Performed by: ADVANCED PRACTICE MIDWIFE

## 2017-10-25 PROCEDURE — 59025 FETAL NON-STRESS TEST: CPT | Performed by: ADVANCED PRACTICE MIDWIFE

## 2017-10-25 PROCEDURE — 81001 URINALYSIS AUTO W/SCOPE: CPT | Performed by: ADVANCED PRACTICE MIDWIFE

## 2017-10-25 PROCEDURE — G0463 HOSPITAL OUTPT CLINIC VISIT: HCPCS

## 2017-10-25 PROCEDURE — 59025 FETAL NON-STRESS TEST: CPT

## 2017-10-25 RX ORDER — GRANULES FOR ORAL 3 G/1
POWDER ORAL
Status: COMPLETED
Start: 2017-10-25 | End: 2017-10-25

## 2017-10-25 RX ORDER — GRANULES FOR ORAL 3 G/1
3 POWDER ORAL ONCE
Status: COMPLETED | OUTPATIENT
Start: 2017-10-25 | End: 2017-10-25

## 2017-10-25 RX ADMIN — GRANULES FOR ORAL 3 G: 3 POWDER ORAL at 03:57

## 2017-10-25 RX ADMIN — FOSFOMYCIN TROMETHAMINE 3 G: 3 POWDER ORAL at 03:57

## 2017-10-25 NOTE — NON STRESS TEST
Marlene Barbosa, a  at 34w4d with an MELY of 2017, by Ultrasound, was seen at [facility] for a nonstress test.    Chief Complaint   Patient presents with   • Abdominal Pain     pt c/o having lower abdominal and upper thigh pain all day.  Denies vaginal bleeding or rupture of membranes.  Reports fetal movement.   • Lower Extremity Issue       Interpretation A  Nonstress Test Interpretation A: Reactive (10/25/17 0315 : Elaine Mike RN)  Comments A: verified with SIMRAN Duggan RN (10/25/17 0315 : Elaine Mike, RN)

## 2017-10-26 ENCOUNTER — ROUTINE PRENATAL (OUTPATIENT)
Dept: OBSTETRICS AND GYNECOLOGY | Facility: CLINIC | Age: 27
End: 2017-10-26

## 2017-10-26 VITALS — WEIGHT: 199 LBS | SYSTOLIC BLOOD PRESSURE: 113 MMHG | BODY MASS INDEX: 32.12 KG/M2 | DIASTOLIC BLOOD PRESSURE: 72 MMHG

## 2017-10-26 DIAGNOSIS — B00.9 HERPES: ICD-10-CM

## 2017-10-26 DIAGNOSIS — Z3A.34 34 WEEKS GESTATION OF PREGNANCY: ICD-10-CM

## 2017-10-26 DIAGNOSIS — O28.0 ABNORMAL QUAD SCREEN: Primary | ICD-10-CM

## 2017-10-26 PROBLEM — O26.899 ABDOMINAL PAIN AFFECTING PREGNANCY: Status: ACTIVE | Noted: 2017-10-26

## 2017-10-26 PROBLEM — R10.9 ABDOMINAL PAIN AFFECTING PREGNANCY: Status: ACTIVE | Noted: 2017-10-26

## 2017-10-26 LAB
BACTERIA SPEC AEROBE CULT: NORMAL
BACTERIA SPEC AEROBE CULT: NORMAL

## 2017-10-26 PROCEDURE — 99212 OFFICE O/P EST SF 10 MIN: CPT | Performed by: ADVANCED PRACTICE MIDWIFE

## 2017-10-26 NOTE — PROGRESS NOTES
CC: BRANDEN visit, history reviewed see history tabs. Reported that she went to the ER due to abdominal pain but denies any pain today and stated that she feels better. Taking valtrex    ROS: Negative leaking fluid from the vagina, swelling in her legs, headache, visual changes, low back pain and heartburn      Educated on:CAIO ISSA breastfeeding    A/Plan: f/u in 2 week/s   Marlene was seen today for routine prenatal visit.    Diagnoses and all orders for this visit:    Abnormal quad screen    Herpes    34 weeks gestation of pregnancy

## 2017-11-09 ENCOUNTER — HOSPITAL ENCOUNTER (INPATIENT)
Facility: HOSPITAL | Age: 27
LOS: 2 days | Discharge: HOME | End: 2017-11-11
Attending: OBSTETRICS & GYNECOLOGY | Admitting: OBSTETRICS & GYNECOLOGY

## 2017-11-09 ENCOUNTER — ROUTINE PRENATAL (OUTPATIENT)
Dept: OBSTETRICS AND GYNECOLOGY | Facility: CLINIC | Age: 27
End: 2017-11-09

## 2017-11-09 VITALS — DIASTOLIC BLOOD PRESSURE: 81 MMHG | SYSTOLIC BLOOD PRESSURE: 122 MMHG | BODY MASS INDEX: 32.44 KG/M2 | WEIGHT: 201 LBS

## 2017-11-09 DIAGNOSIS — O26.893 VAGINAL DISCHARGE DURING PREGNANCY IN THIRD TRIMESTER: ICD-10-CM

## 2017-11-09 DIAGNOSIS — O28.0 ABNORMAL QUAD SCREEN: ICD-10-CM

## 2017-11-09 DIAGNOSIS — Z3A.36 36 WEEKS GESTATION OF PREGNANCY: ICD-10-CM

## 2017-11-09 DIAGNOSIS — N89.8 VAGINAL DISCHARGE DURING PREGNANCY IN THIRD TRIMESTER: ICD-10-CM

## 2017-11-09 DIAGNOSIS — Z36.85 ANTENATAL SCREENING FOR STREPTOCOCCUS B: Primary | ICD-10-CM

## 2017-11-09 LAB
A1 MICROGLOB PLACENTAL VAG QL: NEGATIVE
ABO GROUP BLD: NORMAL
AMPHET+METHAMPHET UR QL: NEGATIVE
BARBITURATES UR QL SCN: NEGATIVE
BENZODIAZ UR QL SCN: NEGATIVE
BLD GP AB SCN SERPL QL: NEGATIVE
CANDIDA ALBICANS: NEGATIVE
CANNABINOIDS SERPL QL: NEGATIVE
COCAINE UR QL: NEGATIVE
DEPRECATED RDW RBC AUTO: 40.1 FL (ref 36.4–46.3)
ERYTHROCYTE [DISTWIDTH] IN BLOOD BY AUTOMATED COUNT: 13.2 % (ref 11.5–14.5)
GARDNERELLA VAGINALIS: POSITIVE
HCT VFR BLD AUTO: 32.7 % (ref 35–45)
HGB BLD-MCNC: 11.1 G/DL (ref 12–15.5)
HOLD SPECIMEN: NORMAL
HOLD SPECIMEN: NORMAL
Lab: NORMAL
MCH RBC QN AUTO: 28.6 PG (ref 26.5–34)
MCHC RBC AUTO-ENTMCNC: 33.9 G/DL (ref 31.4–36)
MCV RBC AUTO: 84.3 FL (ref 80–98)
METHADONE UR QL SCN: NEGATIVE
OPIATES UR QL: NEGATIVE
OXYCODONE UR QL SCN: NEGATIVE
PLATELET # BLD AUTO: 151 10*3/MM3 (ref 150–450)
PMV BLD AUTO: 11.7 FL (ref 8–12)
RBC # BLD AUTO: 3.88 10*6/MM3 (ref 3.77–5.16)
RH BLD: POSITIVE
TRICHOMONAS VAGINALIS PCR: NEGATIVE
WBC NRBC COR # BLD: 11.98 10*3/MM3 (ref 3.2–9.8)

## 2017-11-09 PROCEDURE — 80307 DRUG TEST PRSMV CHEM ANLYZR: CPT | Performed by: ADVANCED PRACTICE MIDWIFE

## 2017-11-09 PROCEDURE — 25010000002 BUTORPHANOL PER 1 MG

## 2017-11-09 PROCEDURE — 86900 BLOOD TYPING SEROLOGIC ABO: CPT | Performed by: ADVANCED PRACTICE MIDWIFE

## 2017-11-09 PROCEDURE — C1755 CATHETER, INTRASPINAL: HCPCS

## 2017-11-09 PROCEDURE — 25010000002 PENICILLIN G POTASSIUM PER 600000 UNITS: Performed by: ADVANCED PRACTICE MIDWIFE

## 2017-11-09 PROCEDURE — 59409 OBSTETRICAL CARE: CPT | Performed by: ADVANCED PRACTICE MIDWIFE

## 2017-11-09 PROCEDURE — 86850 RBC ANTIBODY SCREEN: CPT | Performed by: ADVANCED PRACTICE MIDWIFE

## 2017-11-09 PROCEDURE — 85027 COMPLETE CBC AUTOMATED: CPT | Performed by: ADVANCED PRACTICE MIDWIFE

## 2017-11-09 PROCEDURE — 25010000003 PENICILLIN G POTASSIUM PER 600000 UNITS: Performed by: ADVANCED PRACTICE MIDWIFE

## 2017-11-09 PROCEDURE — 10907ZC DRAINAGE OF AMNIOTIC FLUID, THERAPEUTIC FROM PRODUCTS OF CONCEPTION, VIA NATURAL OR ARTIFICIAL OPENING: ICD-10-PCS | Performed by: ADVANCED PRACTICE MIDWIFE

## 2017-11-09 PROCEDURE — 86901 BLOOD TYPING SEROLOGIC RH(D): CPT | Performed by: ADVANCED PRACTICE MIDWIFE

## 2017-11-09 RX ORDER — CARBOPROST TROMETHAMINE 250 UG/ML
250 INJECTION, SOLUTION INTRAMUSCULAR AS NEEDED
Status: DISCONTINUED | OUTPATIENT
Start: 2017-11-09 | End: 2017-11-09 | Stop reason: HOSPADM

## 2017-11-09 RX ORDER — SODIUM CHLORIDE, SODIUM LACTATE, POTASSIUM CHLORIDE, CALCIUM CHLORIDE 600; 310; 30; 20 MG/100ML; MG/100ML; MG/100ML; MG/100ML
INJECTION, SOLUTION INTRAVENOUS
Status: COMPLETED
Start: 2017-11-09 | End: 2017-11-09

## 2017-11-09 RX ORDER — ACETAMINOPHEN 325 MG/1
650 TABLET ORAL EVERY 4 HOURS PRN
Status: DISCONTINUED | OUTPATIENT
Start: 2017-11-09 | End: 2017-11-09

## 2017-11-09 RX ORDER — CALCIUM CARBONATE 750 MG/1
750 TABLET, CHEWABLE ORAL 2 TIMES DAILY
COMMUNITY
Start: 2017-10-30 | End: 2017-11-30 | Stop reason: HOSPADM

## 2017-11-09 RX ORDER — IBUPROFEN 800 MG/1
800 TABLET ORAL EVERY 6 HOURS PRN
Status: DISCONTINUED | OUTPATIENT
Start: 2017-11-09 | End: 2017-11-11 | Stop reason: HOSPADM

## 2017-11-09 RX ORDER — LIDOCAINE HYDROCHLORIDE 10 MG/ML
5 INJECTION, SOLUTION INFILTRATION; PERINEURAL AS NEEDED
Status: DISCONTINUED | OUTPATIENT
Start: 2017-11-09 | End: 2017-11-11 | Stop reason: HOSPADM

## 2017-11-09 RX ORDER — MAGNESIUM CARB/ALUMINUM HYDROX 105-160MG
TABLET,CHEWABLE ORAL
Status: COMPLETED
Start: 2017-11-09 | End: 2017-11-09

## 2017-11-09 RX ORDER — SODIUM CHLORIDE 0.9 % (FLUSH) 0.9 %
1-10 SYRINGE (ML) INJECTION AS NEEDED
Status: DISCONTINUED | OUTPATIENT
Start: 2017-11-09 | End: 2017-11-11 | Stop reason: HOSPADM

## 2017-11-09 RX ORDER — LANOLIN 100 %
OINTMENT (GRAM) TOPICAL
Status: COMPLETED
Start: 2017-11-09 | End: 2017-11-09

## 2017-11-09 RX ORDER — OXYTOCIN/RINGER'S LACTATE 20/1000 ML
1000 PLASTIC BAG, INJECTION (ML) INTRAVENOUS CONTINUOUS
Status: CANCELLED | OUTPATIENT
Start: 2017-11-09 | End: 2017-11-09

## 2017-11-09 RX ORDER — MISOPROSTOL 200 UG/1
800 TABLET ORAL AS NEEDED
Status: DISCONTINUED | OUTPATIENT
Start: 2017-11-09 | End: 2017-11-09 | Stop reason: HOSPADM

## 2017-11-09 RX ORDER — METHYLERGONOVINE MALEATE 0.2 MG/ML
200 INJECTION INTRAVENOUS ONCE AS NEEDED
Status: DISCONTINUED | OUTPATIENT
Start: 2017-11-09 | End: 2017-11-09 | Stop reason: HOSPADM

## 2017-11-09 RX ORDER — MAGNESIUM CARB/ALUMINUM HYDROX 105-160MG
TABLET,CHEWABLE ORAL ONCE
Status: COMPLETED | OUTPATIENT
Start: 2017-11-09 | End: 2017-11-09

## 2017-11-09 RX ORDER — ACETAMINOPHEN 325 MG/1
650 TABLET ORAL EVERY 4 HOURS PRN
Status: DISCONTINUED | OUTPATIENT
Start: 2017-11-09 | End: 2017-11-11 | Stop reason: HOSPADM

## 2017-11-09 RX ORDER — SODIUM CHLORIDE, SODIUM LACTATE, POTASSIUM CHLORIDE, CALCIUM CHLORIDE 600; 310; 30; 20 MG/100ML; MG/100ML; MG/100ML; MG/100ML
125 INJECTION, SOLUTION INTRAVENOUS CONTINUOUS
Status: DISCONTINUED | OUTPATIENT
Start: 2017-11-09 | End: 2017-11-11 | Stop reason: HOSPADM

## 2017-11-09 RX ORDER — OXYTOCIN/0.9 % SODIUM CHLORIDE 30/500 ML
2-16 PLASTIC BAG, INJECTION (ML) INTRAVENOUS
Status: DISCONTINUED | OUTPATIENT
Start: 2017-11-09 | End: 2017-11-09

## 2017-11-09 RX ORDER — DOCUSATE SODIUM 100 MG/1
100 CAPSULE, LIQUID FILLED ORAL 2 TIMES DAILY
Status: DISCONTINUED | OUTPATIENT
Start: 2017-11-09 | End: 2017-11-10

## 2017-11-09 RX ORDER — ACETAMINOPHEN 325 MG/1
650 TABLET ORAL EVERY 4 HOURS PRN
Status: DISCONTINUED | OUTPATIENT
Start: 2017-11-09 | End: 2017-11-09 | Stop reason: HOSPADM

## 2017-11-09 RX ORDER — SODIUM CHLORIDE, SODIUM LACTATE, POTASSIUM CHLORIDE, CALCIUM CHLORIDE 600; 310; 30; 20 MG/100ML; MG/100ML; MG/100ML; MG/100ML
INJECTION, SOLUTION INTRAVENOUS
Status: DISCONTINUED
Start: 2017-11-09 | End: 2017-11-09 | Stop reason: WASHOUT

## 2017-11-09 RX ORDER — BUTORPHANOL TARTRATE 1 MG/ML
INJECTION, SOLUTION INTRAMUSCULAR; INTRAVENOUS
Status: COMPLETED
Start: 2017-11-09 | End: 2017-11-09

## 2017-11-09 RX ADMIN — MINERAL OIL 30 ML: 99.9 LIQUID ORAL; TOPICAL at 17:50

## 2017-11-09 RX ADMIN — DOCUSATE SODIUM 100 MG: 100 CAPSULE, LIQUID FILLED ORAL at 22:38

## 2017-11-09 RX ADMIN — BENZOCAINE AND MENTHOL: 20; .5 SPRAY TOPICAL at 20:49

## 2017-11-09 RX ADMIN — Medication 30 ML: at 17:50

## 2017-11-09 RX ADMIN — BUTORPHANOL TARTRATE 1 MG: 1 INJECTION, SOLUTION INTRAMUSCULAR; INTRAVENOUS at 14:35

## 2017-11-09 RX ADMIN — MINERAL OIL 30 ML: 99.9 LIQUID ORAL; TOPICAL at 16:59

## 2017-11-09 RX ADMIN — OXYTOCIN-SODIUM CHLORIDE 0.9% IV SOLN 30 UNIT/500ML 2 MILLI-UNITS/MIN: 30-0.9/5 SOLUTION at 18:10

## 2017-11-09 RX ADMIN — SODIUM CHLORIDE, POTASSIUM CHLORIDE, SODIUM LACTATE AND CALCIUM CHLORIDE 125 ML/HR: 600; 310; 30; 20 INJECTION, SOLUTION INTRAVENOUS at 10:15

## 2017-11-09 RX ADMIN — Medication 30 ML: at 16:59

## 2017-11-09 RX ADMIN — SODIUM CHLORIDE 2.5 MILLION UNITS: 9 INJECTION, SOLUTION INTRAVENOUS at 14:34

## 2017-11-09 RX ADMIN — SODIUM CHLORIDE, SODIUM LACTATE, POTASSIUM CHLORIDE, CALCIUM CHLORIDE 125 ML/HR: 600; 310; 30; 20 INJECTION, SOLUTION INTRAVENOUS at 10:15

## 2017-11-09 RX ADMIN — SODIUM CHLORIDE 5 MILLION UNITS: 9 INJECTION, SOLUTION INTRAVENOUS at 10:42

## 2017-11-09 RX ADMIN — Medication: at 20:49

## 2017-11-09 NOTE — PROGRESS NOTES
CC: BRANDEN visit, history reviewed see history tabs.     ROS:Positive contractions and leaking   Negative swelling in her legs, headache, visual changes, low back pain and heartburn    Objective: Amnio test- negative, speculum exam, bulging bag seen, cervix appears 4 cm, no pooling  A/Plan: Sent to labor and delivery  Marlene was seen today for routine prenatal visit.    Diagnoses and all orders for this visit:     screening for streptococcus B  -     Group B Strep (Molecular) - Swab, Vagina    Vaginal discharge during pregnancy in third trimester  -     PAMG-1, Rapid Assay For ROM - Amniotic Fluid, Amniotic Sac  -     Cancel: Gardnerella vaginalis, Trichomonas vaginalis, Candida albicans, PCR - Swab, Vagina  -     Gardnerella vaginalis, Trichomonas vaginalis, Candida albicans, PCR - Swab, Vagina    Marlene was seen today for routine prenatal visit.    Diagnoses and all orders for this visit:     screening for streptococcus B  -     Group B Strep (Molecular) - Swab, Vagina    Vaginal discharge during pregnancy in third trimester  -     PAMG-1, Rapid Assay For ROM - Amniotic Fluid, Amniotic Sac  -     Cancel: Gardnerella vaginalis, Trichomonas vaginalis, Candida albicans, PCR - Swab, Vagina  -     Gardnerella vaginalis, Trichomonas vaginalis, Candida albicans, PCR - Swab, Vagina    Abnormal quad screen    Active  labor, single or unspecified fetus    36 weeks gestation of pregnancy

## 2017-11-09 NOTE — H&P
[facility]  Obstetric History and Physical    No chief complaint on file.      Subjective     Patient is a 27 y.o.  currently at 36w5d, who was sent from office with regular contractions that started after midnight and found to be 4.5 to 5 cm in the office. Reports + fetal movement. Denies vaginal bleeding. Desires natural childbirth for pain control in her labor.    Her prenatal care is complicated by   labor , unknown GBS, abnormal quad screen- positive for down syndrome, history of genital herpes and has been on valtrex.  Her previous obstetric/gynecological history is noted for is non-contributory.    The following portions of the patients history were reviewed and updated as appropriate: current medications, allergies, past medical history, past surgical history, past family history, past social history and problem list .      Past OB History:     Obstetric History       T0      L0     SAB0   TAB0   Ectopic0   Multiple0   Live Births0       # Outcome Date GA Lbr Manny/2nd Weight Sex Delivery Anes PTL Lv   1 Current                   Past Medical History: Past Medical History:   Diagnosis Date   • Abrasion     Abrasion, forearm area      • Acute pharyngitis    • Acute sinusitis    • Allergic rhinitis due to pollen    • Cervicovaginal cytology normal or benign      Low grade squamous intraepithelial lesion      • Common cold    • Eruption cyst     Maculopapular eruption      • Family planning, lunelle surveillance    • Genital herpes simplex    • Headache    • Herpes    • Infestation by Sarcoptes scabiei tarik hominis    • Irregular periods    • Myalgia    • Nausea and vomiting    • Normal gynecologic examination    • Oral contraception initial prescription    • Upper respiratory infection    • Vaginal discharge       Past Surgical History Past Surgical History:   Procedure Laterality Date   • INJECTION OF MEDICATION  10/13/2014    Kenalog(1)   • INJECTION OF MEDICATION  2015     Zofran(1)      Family History: Family History   Problem Relation Age of Onset   • Breast cancer Other    • Colon cancer Neg Hx    • Endometrial cancer Neg Hx    • Ovarian cancer Neg Hx       Social History:  reports that she has quit smoking. She has never used smokeless tobacco.   reports that she does not drink alcohol.   reports that she does not use illicit drugs.      Current Facility-Administered Medications:   •  acetaminophen (TYLENOL) tablet 650 mg, 650 mg, Oral, Q4H PRN, Lysbeth Ildefonso, APRN  •  lactated ringers bolus 1,000 mL, 1,000 mL, Intravenous, Once, Lysbeth Ildefonso, APRN  •  lactated ringers infusion, 125 mL/hr, Intravenous, Continuous, Lysbeth Ildefonso, APRN  •  lidocaine (XYLOCAINE) 1 % injection 5 mL, 5 mL, Intradermal, PRN, Lysbeth Ildefonso, APRN  •  mineral oil, , Topical, Once, Lysbeth Ildefonso, APRN  •  penicillin G potassium 5 Million Units in sodium chloride 0.9 % 100 mL IVPB, 5 Million Units, Intravenous, Once **FOLLOWED BY** penicillin G potassium 2.5 Million Units in sodium chloride 0.9 % 50 mL IVPB, 2.5 Million Units, Intravenous, Q4H, Lysbeth Ildefonso, APRN  •  sodium chloride 0.9 % flush 1-10 mL, 1-10 mL, Intravenous, PRN, Lysbeth Ildefonso, APRN    allergies   No Known Allergies    Home medications  Prenatal Vit-Fe Fumarate-FA (PRENATAL VITAMINS) 28-0.8 MG tablet  Take 1 tablet by mouth Daily., Starting 5/24/2017, Until Discontinued, Normal Last Dose: Not Recorded  Refills: 12 ordered     Pharmacy: [id number] - [place], [place] - [address]  AT [facility] - [phone] [facility] [phone] FX  OrderDon't OrderReplaceDiscontinue  valACYclovir (VALTREX) 500 MG tablet  Take 1 tablet by mouth Daily for 30 days., Starting 10/23/2017, Until Wed 11/22/17, Normal Last Dose: Not Recorded  Refills: 1 ordered     Pharmacy: [place], [place] - [address] [facility] - [phone] [facility] [phone] FX        General ROS: The following systems were reviewed and  negative;  constitution, eyes, cardiovascular, gastrointestinal, genitourinary, hematologic / lymphatic, musculoskeletal, neurological, behavioral/psych and allergies / immunologic    Prenatal Information:  Prenatal Results         Initial Prenatal Labs Ref. Range Date Time   Hemoglobin  13.5 g/dL 12.0 - 15.5 g/dL 05/02/17 1521   Hematocrit  37.9 % 35.0 - 45.0 % 05/02/17 1521   Platelets  169 10*3/mm3 150 - 450 10*3/mm3 09/14/17 1007   Rubella IgG  172.0 IU/mL (H) 0.0 - 9.9 IU/mL 05/02/17 1521      Immune  Immune 05/02/17 1521   Hepatitis B SAg  Negative  Negative 05/02/17 1521   Hepatitis C Ab       RPR  Non-Reactive  Non-Reactive 05/02/17 1521   ABO  A   05/02/17 1521   Rh  Positive   05/02/17 1521   Antibody Screen  Negative   05/02/17 1521   HIV  Negative  Negative 05/02/17 1521   Urine Culture  Mixed Jessika Isolated   10/25/17 0300   Gonorrhea  Not Detected  Not Detected 05/30/17 1254   Chlamydia  Not Detected  Not Detected 05/30/17 1254   TSH  2.02 uIU/ml 0.46 - 4.68 uIU/ml 01/13/17 0015   2nd and 3rd Trimester Ref. Range Date Time   Hemoglobin (repeated)  11.2 g/dL (L) 12.0 - 15.5 g/dL 09/14/17 1007   Hematocrit (repeated)  32.6 % (L) 35.0 - 45.0 % 09/14/17 1007   GCT  129 mg/dL 60 - 140 mg/dL 09/14/17 1007   Antibody Screen (repeated)       GTT Fasting       GTT 1 Hr       GTT 2 Hr       GTT 3 Hr       Group B Strep       Drug Screening Ref. Range Date Time   Amphetamine Screen  Negative  Negative 05/30/17 1254   Barbiturate Screen  Negative  Negative 05/30/17 1254   Benzodiazepine Screen  Negative  Negative 05/30/17 1254   Methadone Screen  Negative  Negative 05/30/17 1254   Phencyclidine Screen       Opiates Screen  Negative  Negative 05/30/17 1254   THC Screen  Negative  Negative 05/30/17 1254   Cocaine Screen  Negative  Negative 05/30/17 1254   Propoxyphene Screen       Buprenorphine Screen       Methamphetamine Screen       Oxycodone Screen  Negative  Negative 05/30/17 1254   Tryicyclic Antidepressants  Screen       Other (Risk screening) Ref. Range Date Time   Varicella IgG       Parvovirus IgG       CMV IgG       Cystic Fibrosis       Hemoglobin electrophoresis       NIPT       MSAFP-4       AFP (for NTD only)              Legend: ^: Historical            View all results for this pregnancy            Objective       Vital Signs Range for the last 24 hours  Temperature: Temp:  [98.1 °F (36.7 °C)] 98.1 °F (36.7 °C)   Temp Source: Temp src: Oral   BP: BP: (122)/(81) 122/81   Respirations: Resp:  [18] 18   O2 Devices O2 Device: room air   Weight: Weight:  [201 lb (91.2 kg)] 201 lb (91.2 kg)     Physical Examination: General appearance - alert, well appearing, and in no distress  Chest - clear to auscultation, no wheezes, rales or rhonchi, symmetric air entry  Heart - normal rate, regular rhythm, normal S1, S2, no murmurs, rubs, clicks or gallops  Abdomen - soft, nontender, nondistended, gravid  Breasts - breasts appear normal, no skin or nipple changes   Pelvic - normal external genitalia, vulva, vagina, cervix, uterus and adnexa  Neurological - alert, oriented, normal speech, no focal findings or movement disorder noted  Musculoskeletal - no joint tenderness, deformity or swelling  Extremities - peripheral pulses normal, no pedal edema, no clubbing or cyanosis     Estimated Fetal Weight: 7-5  Presentation: vertex   Cervix: Exam by:  Mauricio deleon CNM   Dilation:  4.5 cm to 5 cm   Effacement:  100   Station:  -2     Fetal Heart Rate Assessment   Method:  US   Beats/min:  130   Baseline:  130   Varibility:  moderate   Accels:  present   Decels:  absent    Tracing Category:  category 1     Uterine Assessment   Method:  toco   Frequency (min):  4 minutes     Laboratory: ordered    Assessment/Plan     Active Problems:    Active  labor      Assessment:  1.  Intrauterine pregnancy at 36w5d weeks gestation with reactive fetal status.    2.  labor  without ROM  3.   GBS status: unknown  Plan:  1. fetal and  uterine monitoring  continuously and antibiotic for GBS  2. Plan of care has been reviewed with patient and she agrees  3.  Risks, benefits of treatment plan have been discussed.  4.  All questions have been answered.  5.  PCNG for unknown GBS       VICENTE Grace          This document has been electronically signed by VICENTE Grace on November 9, 2017 9:59 AM

## 2017-11-09 NOTE — PROGRESS NOTES
S: patient lying in bed comfortably  O: Cervix 5/100/-2 AROM 1235 small amount blood tinge  A: IUP at 36 weeks and 5 days,  labor, GBS unknown  P:will monitor contractions, discussed pitocin augmentation  All questions answered  PCNG for GBS prophylaxis

## 2017-11-10 LAB — GROUP B STREP, DNA: NEGATIVE

## 2017-11-10 PROCEDURE — 90682 RIV4 VACC RECOMBINANT DNA IM: CPT | Performed by: ADVANCED PRACTICE MIDWIFE

## 2017-11-10 PROCEDURE — G0008 ADMIN INFLUENZA VIRUS VAC: HCPCS | Performed by: ADVANCED PRACTICE MIDWIFE

## 2017-11-10 PROCEDURE — 90715 TDAP VACCINE 7 YRS/> IM: CPT | Performed by: ADVANCED PRACTICE MIDWIFE

## 2017-11-10 PROCEDURE — 25010000002 INFLUENZA VAC SPLIT QUAD SUSPENSION: Performed by: ADVANCED PRACTICE MIDWIFE

## 2017-11-10 PROCEDURE — 90471 IMMUNIZATION ADMIN: CPT | Performed by: ADVANCED PRACTICE MIDWIFE

## 2017-11-10 PROCEDURE — 25010000002 TDAP 5-2.5-18.5 LF-MCG/0.5 SUSPENSION: Performed by: ADVANCED PRACTICE MIDWIFE

## 2017-11-10 RX ORDER — DOCUSATE SODIUM 100 MG/1
100 CAPSULE, LIQUID FILLED ORAL 2 TIMES DAILY PRN
Status: DISCONTINUED | OUTPATIENT
Start: 2017-11-10 | End: 2017-11-11 | Stop reason: HOSPADM

## 2017-11-10 RX ADMIN — INFLUENZA A VIRUS A/MICHIGAN/45/2015 X-275 (H1N1) ANTIGEN (FORMALDEHYDE INACTIVATED), INFLUENZA A VIRUS A/HONG KONG/4801/2014 X-263B (H3N2) ANTIGEN (FORMALDEHYDE INACTIVATED), INFLUENZA B VIRUS B/PHUKET/3073/2013 ANTIGEN (FORMALDEHYDE INACTIVATED), AND INFLUENZA B VIRUS B/BRISBANE/60/2008 ANTIGEN (FORMALDEHYDE INACTIVATED) 0.5 ML: 15; 15; 15; 15 INJECTION, SUSPENSION INTRAMUSCULAR at 01:41

## 2017-11-10 RX ADMIN — TETANUS TOXOID, REDUCED DIPHTHERIA TOXOID AND ACELLULAR PERTUSSIS VACCINE, ADSORBED 0.5 ML: 5; 2.5; 8; 8; 2.5 SUSPENSION INTRAMUSCULAR at 01:42

## 2017-11-10 NOTE — PROGRESS NOTES
[facility]  Marlene Barbosa  : 1990  MRN: [id number]      Postpartum Day #1  Subjective   Her pain is well controlled.  Vaginal bleeding is less than a normal period.  Plans for paragard for birth control     Objective     Min/max vitals past 24 hours:   Temp  Min: 97.7 °F (36.5 °C)  Max: 98.6 °F (37 °C)  BP  Min: 103/61  Max: 134/101  Pulse  Min: 76  Max: 112  Pulse  Min: 76  Max: 112        Abdomen: soft, non-tender; bowel sounds normal; no masses   fundus firm and non-tender   Pelvic: Deferred  Lungs: clear bilaterally  Extremities: no pedal edema bilaterally      Lab Results   Component Value Date    WBC 11.98 (H) 2017    HGB 11.1 (L) 2017    HCT 32.7 (L) 2017    MCV 84.3 2017     2017    RH Positive 2017    HEPBSAG Negative 2017        Assessment   1. Postpartum Day #1 S/P vaginal delivery     Plan   1. Continue routine postpartum care  2. Discharge tomorrow with baby    Mauricio Fregoso, APRN  11/10/2017  11:00 AM

## 2017-11-10 NOTE — PLAN OF CARE
Problem: Labor (Cervical Ripen, Induct, Augment) (Adult,Obstetrics,Pediatric)  Goal: Signs and Symptoms of Listed Potential Problems Will be Absent or Manageable (Labor)  Outcome: Outcome(s) achieved Date Met:  11/09/17

## 2017-11-10 NOTE — PAYOR COMM NOTE
"Khanh Barbosa (27 y.o. Female)     Date of Birth Social Security Number Address Home Phone MRN    1990  [address] [facility] [phone] [id number]    Sikh Marital Status          None Single       Admission Date Admission Type Admitting Provider Attending Provider Department, Room/Bed    17 Elective Shreyas Faust MD Stitt, John C, MD [facility] MOTHER BABY, M760/1    Discharge Date Discharge Disposition Discharge Destination                      Attending Provider: Shreyas Faust MD     Allergies:  No Known Allergies    Isolation:  None   Infection:  None   Code Status:  FULL    Ht:  66\" (167.6 cm)   Wt:  201 lb (91.2 kg)    Admission Cmt:  None   Principal Problem:  None                Active Insurance as of 2017     Primary Coverage     Payor Plan Insurance Group Employer/Plan Group    [employer] MEDICAID [employer] [id number]     Payor Plan Address Payor Plan Phone Number Effective From Effective To    PO  [phone] 2017     [place] [address]       Subscriber Name Subscriber Birth Date Member ID       KHANH BARBOSA 1990 [id number]                 Emergency Contacts      (Rel.) Home Phone Work Phone Mobile Phone    Sana Barbosa (Mother) [phone]               History & Physical      VICENTE Grace at 2017  9:59 AM          [facility]  Obstetric History and Physical    No chief complaint on file.      Subjective     Patient is a 27 y.o.  currently at 36w5d, who was sent from office with regular contractions that started after midnight and found to be 4.5 to 5 cm in the office. Reports + fetal movement. Denies vaginal bleeding. Desires natural childbirth for pain control in her labor.    Her prenatal care is complicated by   labor , unknown GBS, abnormal quad screen- positive for down syndrome, history of genital herpes and has been on valtrex. "  Her previous obstetric/gynecological history is noted for is non-contributory.    The following portions of the patients history were reviewed and updated as appropriate: current medications, allergies, past medical history, past surgical history, past family history, past social history and problem list .      Past OB History:     Obstetric History       T0      L0     SAB0   TAB0   Ectopic0   Multiple0   Live Births0       # Outcome Date GA Lbr Manny/2nd Weight Sex Delivery Anes PTL Lv   1 Current                   Past Medical History: Past Medical History:   Diagnosis Date   • Abrasion     Abrasion, forearm area      • Acute pharyngitis    • Acute sinusitis    • Allergic rhinitis due to pollen    • Cervicovaginal cytology normal or benign      Low grade squamous intraepithelial lesion      • Common cold    • Eruption cyst     Maculopapular eruption      • Family planning, lunelle surveillance    • Genital herpes simplex    • Headache    • Herpes    • Infestation by Sarcoptes scabiei tarik hominis    • Irregular periods    • Myalgia    • Nausea and vomiting    • Normal gynecologic examination    • Oral contraception initial prescription    • Upper respiratory infection    • Vaginal discharge       Past Surgical History Past Surgical History:   Procedure Laterality Date   • INJECTION OF MEDICATION  10/13/2014    Kenalog(1)   • INJECTION OF MEDICATION  2015    Zofran(1)      Family History: Family History   Problem Relation Age of Onset   • Breast cancer Other    • Colon cancer Neg Hx    • Endometrial cancer Neg Hx    • Ovarian cancer Neg Hx       Social History:  reports that she has quit smoking. She has never used smokeless tobacco.   reports that she does not drink alcohol.   reports that she does not use illicit drugs.      Current Facility-Administered Medications:   •  acetaminophen (TYLENOL) tablet 650 mg, 650 mg, Oral, Q4H PRN, VICENTE Grace  •  lactated ringers bolus 1,000 mL,  1,000 mL, Intravenous, Once, VICENTE Grace  •  lactated ringers infusion, 125 mL/hr, Intravenous, Continuous, VICENTE Grace  •  lidocaine (XYLOCAINE) 1 % injection 5 mL, 5 mL, Intradermal, PRN, VICENTE Grace  •  mineral oil, , Topical, Once, VICENTE Grace  •  penicillin G potassium 5 Million Units in sodium chloride 0.9 % 100 mL IVPB, 5 Million Units, Intravenous, Once **FOLLOWED BY** penicillin G potassium 2.5 Million Units in sodium chloride 0.9 % 50 mL IVPB, 2.5 Million Units, Intravenous, Q4H, VIECNTE Grace  •  sodium chloride 0.9 % flush 1-10 mL, 1-10 mL, Intravenous, PRN, VICENTE Grace    allergies   No Known Allergies    Home medications  Prenatal Vit-Fe Fumarate-FA (PRENATAL VITAMINS) 28-0.8 MG tablet  Take 1 tablet by mouth Daily., Starting 5/24/2017, Until Discontinued, Normal Last Dose: Not Recorded  Refills: 12 ordered     [facility] [address] [place] [address] - [address] [facility] [phone] [facility] [phone] FX  OrderDon't OrderReplaceDiscontinue  valACYclovir (VALTREX) 500 MG tablet  Take 1 tablet by mouth Daily for 30 days., Starting 10/23/2017, Until Wed 11/22/17, Normal Last Dose: Not Recorded  Refills: 1 ordered     Pharmacy: [place], [place] - [address] [facility] [phone] [facility] [phone] FX        General ROS: The following systems were reviewed and negative;  constitution, eyes, cardiovascular, gastrointestinal, genitourinary, hematologic / lymphatic, musculoskeletal, neurological, behavioral/psych and allergies / immunologic    Prenatal Information:  Prenatal Results         Initial Prenatal Labs Ref. Range Date Time   Hemoglobin  13.5 g/dL 12.0 - 15.5 g/dL 05/02/17 1521   Hematocrit  37.9 % 35.0 - 45.0 % 05/02/17 1521   Platelets  169 10*3/mm3 150 - 450 10*3/mm3 09/14/17 1007   Rubella IgG  172.0 IU/mL (H) 0.0 - 9.9 IU/mL 05/02/17 1521      Immune  Immune 05/02/17 1521   Hepatitis B  SAg  Negative  Negative 05/02/17 1521   Hepatitis C Ab       RPR  Non-Reactive  Non-Reactive 05/02/17 1521   ABO  A   05/02/17 1521   Rh  Positive   05/02/17 1521   Antibody Screen  Negative   05/02/17 1521   HIV  Negative  Negative 05/02/17 1521   Urine Culture  Mixed Jessika Isolated   10/25/17 0300   Gonorrhea  Not Detected  Not Detected 05/30/17 1254   Chlamydia  Not Detected  Not Detected 05/30/17 1254   TSH  2.02 uIU/ml 0.46 - 4.68 uIU/ml 01/13/17 0015   2nd and 3rd Trimester Ref. Range Date Time   Hemoglobin (repeated)  11.2 g/dL (L) 12.0 - 15.5 g/dL 09/14/17 1007   Hematocrit (repeated)  32.6 % (L) 35.0 - 45.0 % 09/14/17 1007   GCT  129 mg/dL 60 - 140 mg/dL 09/14/17 1007   Antibody Screen (repeated)       GTT Fasting       GTT 1 Hr       GTT 2 Hr       GTT 3 Hr       Group B Strep       Drug Screening Ref. Range Date Time   Amphetamine Screen  Negative  Negative 05/30/17 1254   Barbiturate Screen  Negative  Negative 05/30/17 1254   Benzodiazepine Screen  Negative  Negative 05/30/17 1254   Methadone Screen  Negative  Negative 05/30/17 1254   Phencyclidine Screen       Opiates Screen  Negative  Negative 05/30/17 1254   THC Screen  Negative  Negative 05/30/17 1254   Cocaine Screen  Negative  Negative 05/30/17 1254   Propoxyphene Screen       Buprenorphine Screen       Methamphetamine Screen       Oxycodone Screen  Negative  Negative 05/30/17 1254   Tryicyclic Antidepressants Screen       Other (Risk screening) Ref. Range Date Time   Varicella IgG       Parvovirus IgG       CMV IgG       Cystic Fibrosis       Hemoglobin electrophoresis       NIPT       MSAFP-4       AFP (for NTD only)              Legend: ^: Historical            View all results for this pregnancy            Objective       Vital Signs Range for the last 24 hours  Temperature: Temp:  [98.1 °F (36.7 °C)] 98.1 °F (36.7 °C)   Temp Source: Temp src: Oral   BP: BP: (122)/(81) 122/81   Respirations: Resp:  [18] 18   O2 Devices O2 Device: room air    Weight: Weight:  [201 lb (91.2 kg)] 201 lb (91.2 kg)     Physical Examination: General appearance - alert, well appearing, and in no distress  Chest - clear to auscultation, no wheezes, rales or rhonchi, symmetric air entry  Heart - normal rate, regular rhythm, normal S1, S2, no murmurs, rubs, clicks or gallops  Abdomen - soft, nontender, nondistended, gravid  Breasts - breasts appear normal, no skin or nipple changes   Pelvic - normal external genitalia, vulva, vagina, cervix, uterus and adnexa  Neurological - alert, oriented, normal speech, no focal findings or movement disorder noted  Musculoskeletal - no joint tenderness, deformity or swelling  Extremities - peripheral pulses normal, no pedal edema, no clubbing or cyanosis     Estimated Fetal Weight: 7-5  Presentation: vertex   Cervix: Exam by:  Mauricio deleon CNM   Dilation:  4.5 cm to 5 cm   Effacement:  100   Station:  -2     Fetal Heart Rate Assessment   Method:  US   Beats/min:  130   Baseline:  130   Varibility:  moderate   Accels:  present   Decels:  absent    Tracing Category:  category 1     Uterine Assessment   Method:  toco   Frequency (min):  4 minutes     Laboratory: ordered    Assessment/Plan     Active Problems:    Active  labor      Assessment:  1.  Intrauterine pregnancy at 36w5d weeks gestation with reactive fetal status.    2.  labor  without ROM  3.   GBS status: unknown  Plan:  1. fetal and uterine monitoring  continuously and antibiotic for GBS  2. Plan of care has been reviewed with patient and she agrees  3.  Risks, benefits of treatment plan have been discussed.  4.  All questions have been answered.  5.  PCNG for unknown GBS       VICENTE Grace          This document has been electronically signed by VICENTE Grace on 2017 9:59 AM               Electronically signed by Shreyas Faust MD at 2017 10:51 AM           Operative/Procedure Notes (all)      VICENTE Grace at 2017  6:54 PM   Version 2 of [address] [place] [address]  Vaginal Delivery Note    Delivery     Delivery: Vaginal, Spontaneous Delivery     YOB: 2016    Time of Birth: 6:25 PM      Anesthesia: None     Delivering clinician:   Mauricio Fregoso CNM      Delivery narrative:  Patient is a now  who presented at 36w5d weeks gestation for regular contractions and found to be 5 cm in clinic. Course of labor was unremarkable. Cervix was 5 cm on admission. She received stadol for pain control in her labor. She had AROM at 1235. Cervix progressed to complete at 1648. Patient proceeded to a spontaneous vaginal delivery of a viable female infant at female, over  an intact perineum. No  nuchal noted. Shoulders delivered without difficulty. Lusty cry. Infant placed on maternal abdomen. After cessation of pulsating, cord was double-clamped and cut with 3 vessels noted. Cord blood specimen was obtained and sent to lab for cord blood profile. Placenta delivered spontaneously at 1834 in cristhian position. Fundus was firm to massage. Estimated blood loss: Est. Blood Loss (mL): 150 mL (Filed from Delivery Summary) (17). Inspection of vaginal wall, perineum, and vestibule revealed a no laceration. APGARS: 7   @ 1 minute / 9   @ 5 minutes. Infant weight: pending.  Mother and baby enter recovery in stable condition.     Complications   delivery  Infant    Findings: female  infant     Infant observations: Weight: pending  Length:   pending  Observations/Comments:         Apgars: 7   @ 1 minute /    9   @ 5 minutes     Placenta, Cord, and Fluid    Placenta delivered  Spontaneous  at     6:34 PM     Cord: 3 vessels  present.   Nuchal Cord?  no   Cord blood obtained: Yes    Cord gases obtained:  No      Repair    Episiotomy: None    Lacerations: No   Estimated Blood Loss: Est. Blood Loss (mL): 150 mL (Filed from Delivery Summary) (17)           Disposition  Mother to Remain in LD  in stable condition  currently.  Baby to remains with mom  in stable condition currently.          This document has been electronically signed by VICENTE Grace on 2017 6:54 PM               Electronically signed by VICENTE Grace at 2017  6:59 PM      VICENTE Grace at 2017  6:54 PM  Version 1 of 2         [facility]  Vaginal Delivery Note    Delivery     Delivery: Vaginal, Spontaneous Delivery     YOB: 2016    Time of Birth: 6:25 PM      Anesthesia: None     Delivering clinician:   Mauricio Fregoso CNWILMA      Delivery narrative:  Patient is a now  who presented at 36w5d weeks gestation for regular contractions and found to be 5 cm in clinic. Course of labor was unremarkable. Cervix was 5 cm on admission. She received stadol for pain control in her labor. She had AROM at 1235. Cervix progressed to complete at 1648. Patient proceeded to a spontaneous vaginal delivery of a viable female infant at female, over  an intact perineum. No  nuchal noted. Shoulders delivered without difficulty. Lusty cry. Infant placed on maternal abdomen. After cessation of pulsating, cord was double-clamped and cut with 3 vessels noted. Cord blood specimen was obtained and sent to lab for cord blood profile. Placenta delivered spontaneously at 1834 in cristhian position. Fundus was firm to massage. Estimated blood loss: Est. Blood Loss (mL): 150 mL (Filed from Delivery Summary) (17 1825). Inspection of vaginal wall, perineum, and vestibule revealed a no laceration. APGARS: 7   @ 1 minute / 9   @ 5 minutes. Infant weight: pending.  Mother and baby enter recovery in stable condition.     Complications  none    Infant    Findings: female  infant     Infant observations: Weight: pending  Length:   pending  Observations/Comments:         Apgars: 7   @ 1 minute /    9   @ 5 minutes     Placenta, Cord, and Fluid    Placenta delivered  Spontaneous  at     6:34 PM     Cord: 3 vessels  present.    Nuchal Cord?  no   Cord blood obtained: Yes    Cord gases obtained:  No      Repair    Episiotomy: None    Lacerations: No   Estimated Blood Loss: Est. Blood Loss (mL): 150 mL (Filed from Delivery Summary) (17 182)           Disposition  Mother to Remain in LD  in stable condition currently.  Baby to remains with mom  in stable condition currently.          This document has been electronically signed by VICENTE Grace on 2017 6:54 PM               Electronically signed by VICENTE Grace at 2017  6:59 PM           Physician Progress Notes (all)      Shreyas Carlos Jr., MD at 11/10/2017  6:26 AM  Version 1 of 1    Attestation signed by Shreyas Faust MD at 11/10/2017  6:39 AM        I have reviewed the documentation above and agree.                                     POSTPARTUM PROGRESS NOTE  NAME: Marlene Barbosa  : 1990  MRN: [id number]      LOS: 1 day     Chief Complaint: No Complaints    Subjective:     Interval History:  Pain well controlled with medications, (+) Flatus, No BM, lochia minimal, (+) voiding, not ambulating yet.  Unsure about birth control plans currently.  Breastfeeding.     Review of Systems:   GEN: No fever/chills, no dizziness or lightheadedness  CVS: No chest pain, no palpitations  RESP: No shortness of breath  GI: No nausea or vomiting    Objective:     Vital Signs  Temp:  [97.7 °F (36.5 °C)-98.6 °F (37 °C)] 98.2 °F (36.8 °C)  Heart Rate:  [] 87  Resp:  [16-18] 16  BP: (103-134)/() 112/65    Physical Exam  GEN: A&O x3, NAD  CVS: RRR, S1/S2, no m/g/r  LUNGS: CTAB, no wheezes, no rhonchi  ABD: Soft, Nontender  Fundus: firm below umbilicus  EXT: no edema, no calf tenderness bilaterally.    Medication Review    Current Facility-Administered Medications:   •  acetaminophen (TYLENOL) tablet 650 mg, 650 mg, Oral, Q4H PRN, VICENTE Grace  •  benzocaine-lanolin-aloe vera (DERMOPLAST) 20-0.5 % topical spray, , Topical, PRN, Trippth  Ildefonso, APRN  •  docusate sodium (COLACE) capsule 100 mg, 100 mg, Oral, BID, Lysbeart Fregoso, APRN, 100 mg at 11/09/17 2238  •  hydrocortisone (ANUSOL-HC) 2.5 % rectal cream 1 application, 1 application, Rectal, PRN, Mauricio Fregoso, APRN  •  ibuprofen (ADVIL,MOTRIN) tablet 800 mg, 800 mg, Oral, Q6H PRN, Mauricio Fregoso, APRN  •  lactated ringers infusion, 125 mL/hr, Intravenous, Continuous, Mauricio Fregoso, APRN, Last Rate: 125 mL/hr at 11/09/17 1015, 125 mL/hr at 11/09/17 1015  •  lidocaine (XYLOCAINE) 1 % injection 5 mL, 5 mL, Intradermal, PRN, Mauricio Fregoso, APRN  •  magnesium hydroxide (MILK OF MAGNESIA) suspension 2400 mg/10mL 10 mL, 10 mL, Oral, Daily PRN, Mauricio Fregoso, APRN  •  pramoxine-hydrocortisone 1-1 % foam, , Topical, PRN, Mauricio Fregoso, APRN  •  sodium chloride 0.9 % flush 1-10 mL, 1-10 mL, Intravenous, PRN, Mauricio Huo, APRN  •  sodium chloride 0.9 % flush 1-10 mL, 1-10 mL, Intravenous, PRN, Mauriico Fregoso, APRN     Diagnostic Data    Lab Results (last 24 hours)     Procedure Component Value Units Date/Time    CBC (No Diff) [[id number]]  (Abnormal) Collected:  11/09/17 1022    Specimen:  Blood Updated:  11/09/17 1037     WBC 11.98 (H) 10*3/mm3      RBC 3.88 10*6/mm3      Hemoglobin 11.1 (L) g/dL      Hematocrit 32.7 (L) %      MCV 84.3 fL      MCH 28.6 pg      MCHC 33.9 g/dL      RDW 13.2 %      RDW-SD 40.1 fl      MPV 11.7 fL      Platelets 151 10*3/mm3     Extra Tubes [[id number]] Collected:  11/09/17 1033    Specimen:  Blood from Blood, Venous Line Updated:  11/09/17 1146    Narrative:       The following orders were created for panel order Extra Tubes.  Procedure                               Abnormality         Status                     ---------                               -----------         ------                     Gold Top - [facility][[id number]]                                   Final result               Green Top (Gel)[[id number]]                                  Final result                  Please view results for these tests on the individual orders.    Gold Top - [facility] [[id number]] Collected:  17 1033    Specimen:  Blood Updated:  17 1146     Extra Tube Hold for add-ons.      Auto resulted.       Green Top (Gel) [[id number]] Collected:  17 1034    Specimen:  Blood Updated:  17 1146     Extra Tube Hold for add-ons.      Auto resulted.       Urine Drug Screen - [[id number]]  (Normal) Collected:  17 1203    Specimen:  Urine Updated:  17 1243     Amphetamine Screen, Urine Negative     Barbiturates Screen, Urine Negative     Benzodiazepine Screen, Urine Negative     Cocaine Screen, Urine Negative     Methadone Screen, Urine Negative     Opiate Screen Negative     Oxycodone Screen, Urine Negative     THC, Screen, Urine Negative    Narrative:       Negative Thresholds For Drugs Screened in Urine:     Amphetamines          500 ng/ml  Barbiturates          200 ng/ml  Benzodiazepines       200 ng/ml  Cocaine               150 ng/ml  Methadone             300 ng/mL  Opiates               300 ng/mL  Oxycodone             100 ng/mL  THC                   20 ng/mL    The normal value for all drugs tested is negative. This report includes final unconfirmed screening results.  A positive result by this assay can be, at your request, sent to the Reference Lab for confirmation by gas chromatography. Unconfirmed results must not be used for non-medical purposes, such as employment or legal testing. Clinical consideration should be applied to any drug of abuse test result, particularly when unconfirmed results are used.          I reviewed the patient's new clinical results.    Assessment/Plan:     Marlene Barbosa 27 y.o.  s/p  doing well  1. Encourage ambulation  2. Continue routine postpartum care.    Plan for disposition: Discharge home on ppd#1-2    Signature        This document has been electronically signed by Shreyas Carlos Jr, MD on November 10, 2017 6:26  AM                         Electronically signed by Shreyas Faust MD at 11/10/2017  6:39 AM        Consult Notes (all)     No notes of this type exist for this encounter.      Initial request for delivery  Dorothy Graham RN,   [phone] phone  [phone] fax

## 2017-11-10 NOTE — PLAN OF CARE
Problem: Patient Care Overview (Adult)  Goal: Plan of Care Review  Outcome: Ongoing (interventions implemented as appropriate)    17 1916   Coping/Psychosocial Response Interventions   Plan Of Care Reviewed With patient   Patient Care Overview   Progress progress toward functional goals as expected   Outcome Evaluation   Outcome Summary/Follow up Plan  of a viable female infant  @ 1825, no lacerations, VSS, FF @ u/3       Goal: Adult Individualization and Mutuality  Outcome: Ongoing (interventions implemented as appropriate)  Goal: Discharge Needs Assessment  Outcome: Ongoing (interventions implemented as appropriate)

## 2017-11-10 NOTE — PLAN OF CARE
Problem: Patient Care Overview (Adult)  Goal: Plan of Care Review  Outcome: Ongoing (interventions implemented as appropriate)    11/10/17 0404   Coping/Psychosocial Response Interventions   Plan Of Care Reviewed With patient;significant other   Outcome Evaluation   Outcome Summary/Follow up Plan vss, fundus firm at -3 with moderate to small bleeding, ambulating well, no complaints of pain        Goal: Adult Individualization and Mutuality  Outcome: Ongoing (interventions implemented as appropriate)  Goal: Discharge Needs Assessment  Outcome: Ongoing (interventions implemented as appropriate)    Problem: Breastfeeding (Adult,NICU,,Obstetrics,Pediatric)  Goal: Signs and Symptoms of Listed Potential Problems Will be Absent or Manageable (Breastfeeding)  Outcome: Ongoing (interventions implemented as appropriate)    Problem: Postpartum, Vaginal Delivery (Adult)  Goal: Signs and Symptoms of Listed Potential Problems Will be Absent or Manageable (Postpartum, Vaginal Delivery)  Outcome: Ongoing (interventions implemented as appropriate)

## 2017-11-10 NOTE — L&D DELIVERY NOTE
[facility]  Vaginal Delivery Note    Delivery     Delivery: Vaginal, Spontaneous Delivery     YOB: 2016    Time of Birth: 6:25 PM      Anesthesia: None     Delivering clinician:   Mauricio Fregoso CNM      Delivery narrative:  Patient is a now  who presented at 36w5d weeks gestation for regular contractions and found to be 5 cm in clinic. Course of labor was unremarkable. Cervix was 5 cm on admission. She received stadol for pain control in her labor. She had AROM at 1235. Cervix progressed to complete at 1648. Patient proceeded to a spontaneous vaginal delivery of a viable female infant at female, over  an intact perineum. No  nuchal noted. Shoulders delivered without difficulty. Lusty cry. Infant placed on maternal abdomen. After cessation of pulsating, cord was double-clamped and cut with 3 vessels noted. Cord blood specimen was obtained and sent to lab for cord blood profile. Placenta delivered spontaneously at 1834 in cristhian position. Fundus was firm to massage. Estimated blood loss: Est. Blood Loss (mL): 150 mL (Filed from Delivery Summary) (17). Inspection of vaginal wall, perineum, and vestibule revealed a no laceration. APGARS: 7   @ 1 minute / 9   @ 5 minutes. Infant weight: pending.  Mother and baby enter recovery in stable condition.     Complications   delivery  Infant    Findings: female  infant     Infant observations: Weight: pending  Length:   pending  Observations/Comments:         Apgars: 7   @ 1 minute /    9   @ 5 minutes     Placenta, Cord, and Fluid    Placenta delivered  Spontaneous  at     6:34 PM     Cord: 3 vessels  present.   Nuchal Cord?  no   Cord blood obtained: Yes    Cord gases obtained:  No      Repair    Episiotomy: None    Lacerations: No   Estimated Blood Loss: Est. Blood Loss (mL): 150 mL (Filed from Delivery Summary) (17)           Disposition  Mother to Remain in LD  in stable condition currently.  Baby to remains with  mom  in stable condition currently.          This document has been electronically signed by VICENTE Grace on November 9, 2017 6:54 PM

## 2017-11-10 NOTE — PROGRESS NOTES
POSTPARTUM PROGRESS NOTE  NAME: Marlene Barbosa  : 1990  MRN: [id number]      LOS: 1 day     Chief Complaint: No Complaints    Subjective:     Interval History:  Pain well controlled with medications, (+) Flatus, No BM, lochia minimal, (+) voiding, not ambulating yet.  Unsure about birth control plans currently.  Breastfeeding.     Review of Systems:   GEN: No fever/chills, no dizziness or lightheadedness  CVS: No chest pain, no palpitations  RESP: No shortness of breath  GI: No nausea or vomiting    Objective:     Vital Signs  Temp:  [97.7 °F (36.5 °C)-98.6 °F (37 °C)] 98.2 °F (36.8 °C)  Heart Rate:  [] 87  Resp:  [16-18] 16  BP: (103-134)/() 112/65    Physical Exam  GEN: A&O x3, NAD  CVS: RRR, S1/S2, no m/g/r  LUNGS: CTAB, no wheezes, no rhonchi  ABD: Soft, Nontender  Fundus: firm below umbilicus  EXT: no edema, no calf tenderness bilaterally.    Medication Review    Current Facility-Administered Medications:   •  acetaminophen (TYLENOL) tablet 650 mg, 650 mg, Oral, Q4H PRN, Lysbeth Ildefonso, APRN  •  benzocaine-lanolin-aloe vera (DERMOPLAST) 20-0.5 % topical spray, , Topical, PRN, Lysbeth Ildefonso, APRN  •  docusate sodium (COLACE) capsule 100 mg, 100 mg, Oral, BID, Lysbeth Ildefonso, APRN, 100 mg at 17 2238  •  hydrocortisone (ANUSOL-HC) 2.5 % rectal cream 1 application, 1 application, Rectal, PRN, Lysbeth Ildefonso, APRN  •  ibuprofen (ADVIL,MOTRIN) tablet 800 mg, 800 mg, Oral, Q6H PRN, Lysbeth Ildefonso, APRN  •  lactated ringers infusion, 125 mL/hr, Intravenous, Continuous, Mauricio Fregoso, APRN, Last Rate: 125 mL/hr at 17 1015, 125 mL/hr at 17 1015  •  lidocaine (XYLOCAINE) 1 % injection 5 mL, 5 mL, Intradermal, PRN, Mauricio Fregoso APRN  •  magnesium hydroxide (MILK OF MAGNESIA) suspension 2400 mg/10mL 10 mL, 10 mL, Oral, Daily PRN, VICENTE Grace  •  pramoxine-hydrocortisone 1-1 % foam, , Topical, PRN, Mauricio Fregoso APRN  •  sodium chloride 0.9 % flush 1-10 mL, 1-10  mL, Intravenous, PRN, Lysbeth Ildefonso, APRN  •  sodium chloride 0.9 % flush 1-10 mL, 1-10 mL, Intravenous, PRN, Lysbeth Ildefonso, APRN     Diagnostic Data    Lab Results (last 24 hours)     Procedure Component Value Units Date/Time    CBC (No Diff) [[id number]]  (Abnormal) Collected:  11/09/17 1022    Specimen:  Blood Updated:  11/09/17 1037     WBC 11.98 (H) 10*3/mm3      RBC 3.88 10*6/mm3      Hemoglobin 11.1 (L) g/dL      Hematocrit 32.7 (L) %      MCV 84.3 fL      MCH 28.6 pg      MCHC 33.9 g/dL      RDW 13.2 %      RDW-SD 40.1 fl      MPV 11.7 fL      Platelets 151 10*3/mm3     Extra Tubes [[id number]] Collected:  11/09/17 1033    Specimen:  Blood from Blood, Venous Line Updated:  11/09/17 1146    Narrative:       The following orders were created for panel order Extra Tubes.  Procedure                               Abnormality         Status                     ---------                               -----------         ------                     Gold Top - SST[[id number]]                                   Final result               Green Top (Gel)[[id number]]                                  Final result                 Please view results for these tests on the individual orders.    Gold Top - SST [[id number]] Collected:  11/09/17 1033    Specimen:  Blood Updated:  11/09/17 1146     Extra Tube Hold for add-ons.      Auto resulted.       Green Top (Gel) [[id number]] Collected:  11/09/17 1034    Specimen:  Blood Updated:  11/09/17 1146     Extra Tube Hold for add-ons.      Auto resulted.       Urine Drug Screen - [[id number]]  (Normal) Collected:  11/09/17 1203    Specimen:  Urine Updated:  11/09/17 1243     Amphetamine Screen, Urine Negative     Barbiturates Screen, Urine Negative     Benzodiazepine Screen, Urine Negative     Cocaine Screen, Urine Negative     Methadone Screen, Urine Negative     Opiate Screen Negative     Oxycodone Screen, Urine Negative     THC, Screen, Urine Negative    Narrative:       Negative  Thresholds For Drugs Screened in Urine:     Amphetamines          500 ng/ml  Barbiturates          200 ng/ml  Benzodiazepines       200 ng/ml  Cocaine               150 ng/ml  Methadone             300 ng/mL  Opiates               300 ng/mL  Oxycodone             100 ng/mL  THC                   20 ng/mL    The normal value for all drugs tested is negative. This report includes final unconfirmed screening results.  A positive result by this assay can be, at your request, sent to the Reference Lab for confirmation by gas chromatography. Unconfirmed results must not be used for non-medical purposes, such as employment or legal testing. Clinical consideration should be applied to any drug of abuse test result, particularly when unconfirmed results are used.          I reviewed the patient's new clinical results.    Assessment/Plan:     Marlene Barbosa 27 y.o.  s/p  doing well  1. Encourage ambulation  2. Continue routine postpartum care.    Plan for disposition: Discharge home on ppd#1-2    Signature        This document has been electronically signed by Shreyas Carlos Jr, MD on November 10, 2017 6:26 AM

## 2017-11-11 VITALS
HEIGHT: 66 IN | HEART RATE: 76 BPM | RESPIRATION RATE: 18 BRPM | SYSTOLIC BLOOD PRESSURE: 117 MMHG | TEMPERATURE: 98.2 F | DIASTOLIC BLOOD PRESSURE: 71 MMHG | OXYGEN SATURATION: 98 % | BODY MASS INDEX: 32.3 KG/M2 | WEIGHT: 201 LBS

## 2017-11-11 PROBLEM — O26.899 ABDOMINAL PAIN AFFECTING PREGNANCY: Status: RESOLVED | Noted: 2017-10-26 | Resolved: 2017-11-11

## 2017-11-11 PROBLEM — R10.9 ABDOMINAL PAIN AFFECTING PREGNANCY: Status: RESOLVED | Noted: 2017-10-26 | Resolved: 2017-11-11

## 2017-11-11 PROBLEM — O28.0 ABNORMAL QUAD SCREEN: Status: RESOLVED | Noted: 2017-08-10 | Resolved: 2017-11-11

## 2017-11-11 PROBLEM — Z34.03 ENCOUNTER FOR SUPERVISION OF NORMAL FIRST PREGNANCY IN THIRD TRIMESTER: Status: RESOLVED | Noted: 2017-05-31 | Resolved: 2017-11-11

## 2017-11-11 NOTE — DISCHARGE SUMMARY
OBSTETRICS DISCHARGE SUMMARY    NAME: Marlene Barbosa     ADMITTED: 2017  : 1990     DISCHARGED: 17  MRN: [id number]    ADMISSION DIAGNOSES:  1. Intrauterine pregnancy at 36w5d GA  2.  Labor without ROM DISCHARGE DIAGNOSES:  1. S/p spontaneous vaginal delivery      PROCEDURES: Vaginal, Spontaneous Delivery   DELIVERING PHYSICIAN: Shreyas Faust MD    HISTORY AND HOSPITAL COURSE:    Patient is a 27 y.o. female  who presented at 36w5d GA in labor.  Estimated Date of Delivery: 17. Her pregnancy was complicated by  labor, unknown GBS status, abnormal quad screen positive for down syndrome, & history of genital herpes. Please see H&P for full details.     She was admitted and progressed in labor with epidural analgesia to completely dilated.  She had a  of a viable female infant, [id number], Apgars 7/9.  No immediate complications were encountered.  Please see procedure note for full details.    Her postpartum course has been unremarkable.  Antepartem H/H was 11.2/32.6, postpartum H/H 11.1/32.7.  She had no signs or symptoms of acute blood loss anemia.  She was ambulating well, voiding without difficulty and lochia was within normal limits. She is bottle feeding.  She was stable for discharge on PPD#2.    DISCHARGE CONDITION: Stable    DISPOSITION: Home    DISCHARGE MEDICATIONS   Marlene Barbosa   Home Medication Instructions LOREN:[id number]    Printed on:17 0928   Medication Information                      calcium carbonate EX (TUMS EX) 750 MG chewable tablet  Chew 750 mg 2 (Two) Times a Day.             Prenatal Vit-Fe Fumarate-FA (PRENATAL VITAMINS) 28-0.8 MG tablet  Take 1 tablet by mouth Daily.             valACYclovir (VALTREX) 500 MG tablet  Take 1 tablet by mouth Daily for 30 days.                 INSTRUCTIONS:  Activity: as tolerated  Diet: as tolerated  Special instructions: Precautions and instructions were discussed with her including but not  limited to maintaining a regular diet at home, practicing local hygiene, pelvic rest and signs and symptoms to report including heavy vaginal bleeding, frequent passage of clots, foul odor of lochia, increased pain, fever or any other concerns.    FOLLOW UP:  Shreyas Faust MD  Follow-up Information     Follow up with No Known Provider .    Contact information:    [place] [phone]            Shreyas Faust MD is the attending at time of discharge, he is aware of the patient's status and agrees with the above discharge summary.    Shreyas Carlos Jr., M.D.  PGY1  Family Practice Resident  [address] [place] [address]  Phone: [phone]  Fax: [phone]      This document has been electronically signed by Shreyas Carlos Jr, MD on November 11, 2017 9:28 AM

## 2017-11-11 NOTE — PLAN OF CARE
Problem: Patient Care Overview (Adult)  Goal: Plan of Care Review  Outcome: Ongoing (interventions implemented as appropriate)    11/10/17 0404 11/10/17 1836   Coping/Psychosocial Response Interventions   Plan Of Care Reviewed With --  patient   Patient Care Overview   Progress --  progress toward functional goals as expected   Outcome Evaluation   Outcome Summary/Follow up Plan vss, fundus firm at -3 with moderate to small bleeding, ambulating well, no complaints of pain  --        Goal: Adult Individualization and Mutuality  Outcome: Ongoing (interventions implemented as appropriate)  Goal: Discharge Needs Assessment  Outcome: Ongoing (interventions implemented as appropriate)    Problem: Breastfeeding (Adult,NICU,[place],Obstetrics,Pediatric)  Goal: Signs and Symptoms of Listed Potential Problems Will be Absent or Manageable (Breastfeeding)  Outcome: Ongoing (interventions implemented as appropriate)    Problem: Postpartum, Vaginal Delivery (Adult)  Goal: Signs and Symptoms of Listed Potential Problems Will be Absent or Manageable (Postpartum, Vaginal Delivery)  Outcome: Ongoing (interventions implemented as appropriate)

## 2017-11-11 NOTE — PROGRESS NOTES
POSTPARTUM PROGRESS NOTE  NAME: Marlene Barbosa  : 1990  MRN: [id number]      LOS: 2 days     Chief Complaint: No Complaints    Subjective:     Interval History:  Pain well controlled with medications, (+) Flatus, No BM, lochia minimal, (+) voiding,+ ambulating.  Unsure about birth control plans currently.  Would like to bottle feed now.    Review of Systems:   GEN: No fever/chills, no dizziness or lightheadedness  CVS: No chest pain, no palpitations  RESP: No shortness of breath  GI: No nausea or vomiting    Objective:     Vital Signs  Temp:  [97.7 °F (36.5 °C)-98.3 °F (36.8 °C)] 97.7 °F (36.5 °C)  Heart Rate:  [76-92] 77  Resp:  [16-18] 16  BP: (105-138)/(58-83) 119/58    Physical Exam  GEN: A&O x3, NAD  CVS: RRR, S1/S2, no m/g/r  LUNGS: CTAB, no wheezes, no rhonchi  ABD: Soft, Nontender  Fundus: firm below umbilicus  EXT: no edema, no calf tenderness bilaterally.    Medication Review    Current Facility-Administered Medications:   •  acetaminophen (TYLENOL) tablet 650 mg, 650 mg, Oral, Q4H PRN, Kathyabeth Ildefonso, APRN  •  benzocaine-lanolin-aloe vera (DERMOPLAST) 20-0.5 % topical spray, , Topical, PRN, Lysbeart Huo, APRN  •  docusate sodium (COLACE) capsule 100 mg, 100 mg, Oral, BID PRN, Lyschris Huo, APRN  •  hydrocortisone (ANUSOL-HC) 2.5 % rectal cream 1 application, 1 application, Rectal, PRN, Lysbeart Huo, APRN  •  ibuprofen (ADVIL,MOTRIN) tablet 800 mg, 800 mg, Oral, Q6H PRN, Lyschris Huo, APRN  •  lactated ringers infusion, 125 mL/hr, Intravenous, Continuous, Mauricio Fregoso, APRN, Last Rate: 125 mL/hr at 17 1015, 125 mL/hr at 17 1015  •  lidocaine (XYLOCAINE) 1 % injection 5 mL, 5 mL, Intradermal, PRN, Lysbeth Ildefonso, APRN  •  magnesium hydroxide (MILK OF MAGNESIA) suspension 2400 mg/10mL 10 mL, 10 mL, Oral, Daily PRN, VICENTE Grace  •  pramoxine-hydrocortisone 1-1 % foam, , Topical, PRN, VICENTE Grace  •  sodium chloride 0.9 % flush 1-10 mL, 1-10 mL,  Intravenous, PRN, Lysbeth Ildefonso, APRN  •  sodium chloride 0.9 % flush 1-10 mL, 1-10 mL, Intravenous, PRN, Lysbeth Ildefonso, APRN     Diagnostic Data    Lab Results (last 24 hours)     ** No results found for the last 24 hours. **          I reviewed the patient's new clinical results.    Assessment/Plan:     Marlene Barbosa 27 y.o.  s/p  doing well  1. Encourage ambulation  2. Continue routine postpartum care.        Signature        This document has been electronically signed by Shreyas Carlos Jr, MD on 2017 7:12 AM

## 2017-11-30 ENCOUNTER — OFFICE VISIT (OUTPATIENT)
Dept: OBSTETRICS AND GYNECOLOGY | Facility: CLINIC | Age: 27
End: 2017-11-30

## 2017-11-30 VITALS — SYSTOLIC BLOOD PRESSURE: 110 MMHG | HEIGHT: 66 IN | DIASTOLIC BLOOD PRESSURE: 70 MMHG

## 2017-11-30 PROCEDURE — 99212 OFFICE O/P EST SF 10 MIN: CPT | Performed by: ADVANCED PRACTICE MIDWIFE

## 2017-11-30 NOTE — PROGRESS NOTES
Subjective   Marlene Barbosa is a 27 y.o. female who presents for a postpartum visit. She is 3 weeks postpartum following a spontaneous vaginal delivery. I have fully reviewed the prenatal and intrapartum course. The delivery was at 36 5/7 gestational weeks. Outcome: spontaneous vaginal delivery. Anesthesia: none. Postpartum course has been unremarkable. Baby's course has been unremarkable. Baby is feeding by formula. Bleeding thin lochia. Bowel function is normal. Bladder function is normal. Patient is not sexually active. Contraception method is none. Postpartum depression screening: negative. EDPS=10. States that she had postpartum blues but she feels better    The following portions of the patient's history were reviewed and updated as appropriate: allergies, current medications, past family history, past medical history, past social history, past surgical history and problem list.    Review of Systems  A comprehensive review of systems was negative.    Objective   There were no vitals taken for this visit.   General:  alert, appears stated age and cooperative    Breasts:  inspection negative, no nipple discharge or bleeding, no masses or nodularity palpable   Lungs: clear to auscultation bilaterally   Heart:  regular rate and rhythm, S1, S2 normal, no murmur, click, rub or gallop   Abdomen: soft, non-tender; bowel sounds normal; no masses,  no organomegaly   skin Warm to touch, no rash   HEENT BA, normocephalic   Muskuloskeletal Normal gait, DTR +2                 Assessment/Plan   3 weeks postpartum exam. Pap smear not done at today's visit.    1. Contraception: none  2. Desires nexplanon. Pros and cons discussed with patient. Needs pap in 3 weeks   3. Follow up in: 3 weeks or as needed.    Marlene was seen today for postpartum care.    Diagnoses and all orders for this visit:    Routine postpartum follow-up

## 2017-12-18 ENCOUNTER — DOCUMENTATION (OUTPATIENT)
Dept: OBSTETRICS AND GYNECOLOGY | Facility: CLINIC | Age: 27
End: 2017-12-18

## 2017-12-18 NOTE — PROGRESS NOTES
Patient came in and stated taht she had intercourse prior to the appointment and that she feels better coming in during her period to get the nexplanon. Patient reported depression but denied suicidal thoughts or ideations. rx for zoloft sent to her pharmacy, reviewed s/s to report and to go to the ER if she has any suicidal thoughts. Patient verbalized understanding. Patient is to call the 1st day of her period.

## 2018-04-27 ENCOUNTER — PROCEDURE VISIT (OUTPATIENT)
Dept: OBSTETRICS AND GYNECOLOGY | Facility: CLINIC | Age: 28
End: 2018-04-27

## 2018-04-27 ENCOUNTER — APPOINTMENT (OUTPATIENT)
Dept: LAB | Facility: HOSPITAL | Age: 28
End: 2018-04-27

## 2018-04-27 VITALS
BODY MASS INDEX: 32.47 KG/M2 | SYSTOLIC BLOOD PRESSURE: 104 MMHG | HEIGHT: 66 IN | WEIGHT: 202 LBS | DIASTOLIC BLOOD PRESSURE: 79 MMHG | HEART RATE: 109 BPM

## 2018-04-27 DIAGNOSIS — Z30.09 GENERAL COUNSELING AND ADVICE FOR CONTRACEPTIVE MANAGEMENT: ICD-10-CM

## 2018-04-27 DIAGNOSIS — Z01.419 WELL WOMAN EXAM WITH ROUTINE GYNECOLOGICAL EXAM: Primary | ICD-10-CM

## 2018-04-27 DIAGNOSIS — Z11.3 SCREEN FOR SEXUALLY TRANSMITTED DISEASES: ICD-10-CM

## 2018-04-27 LAB
HCV AB SER DONR QL: NEGATIVE
HIV1+2 AB SER QL: NEGATIVE

## 2018-04-27 PROCEDURE — 99395 PREV VISIT EST AGE 18-39: CPT | Performed by: NURSE PRACTITIONER

## 2018-04-27 PROCEDURE — 86803 HEPATITIS C AB TEST: CPT | Performed by: NURSE PRACTITIONER

## 2018-04-27 PROCEDURE — 36415 COLL VENOUS BLD VENIPUNCTURE: CPT | Performed by: NURSE PRACTITIONER

## 2018-04-27 PROCEDURE — 87591 N.GONORRHOEAE DNA AMP PROB: CPT | Performed by: NURSE PRACTITIONER

## 2018-04-27 PROCEDURE — 87661 TRICHOMONAS VAGINALIS AMPLIF: CPT | Performed by: NURSE PRACTITIONER

## 2018-04-27 PROCEDURE — 86592 SYPHILIS TEST NON-TREP QUAL: CPT | Performed by: NURSE PRACTITIONER

## 2018-04-27 PROCEDURE — G0432 EIA HIV-1/HIV-2 SCREEN: HCPCS | Performed by: NURSE PRACTITIONER

## 2018-04-27 PROCEDURE — G0123 SCREEN CERV/VAG THIN LAYER: HCPCS | Performed by: NURSE PRACTITIONER

## 2018-04-27 PROCEDURE — 87491 CHLMYD TRACH DNA AMP PROBE: CPT | Performed by: NURSE PRACTITIONER

## 2018-04-27 NOTE — PROGRESS NOTES
Subjective   Marlene Barbosa is a 27 y.o.  here for Pap smear and wants to discuss birth control options.    LMP- -  Last pap- 14 normal      Gynecologic Exam   The patient's pertinent negatives include no genital itching, genital lesions, genital odor, genital rash, missed menses, pelvic pain, vaginal bleeding or vaginal discharge. Pertinent negatives include no abdominal pain, constipation, diarrhea, dysuria, headaches, nausea, rash, urgency or vomiting. She is sexually active. No, her partner does not have an STD. She uses condoms for contraception. Her menstrual history has been regular.       The following portions of the patient's history were reviewed and updated as appropriate: allergies, current medications, past family history, past medical history, past social history, past surgical history and problem list.    Review of Systems   Constitutional: Negative for activity change, appetite change, fatigue and unexpected weight change.   HENT: Negative for congestion and trouble swallowing.    Respiratory: Negative for chest tightness and shortness of breath.    Cardiovascular: Negative for chest pain, palpitations and leg swelling.   Gastrointestinal: Negative for abdominal distention, abdominal pain, blood in stool, constipation, diarrhea, nausea and vomiting.   Endocrine: Negative for cold intolerance, heat intolerance, polydipsia, polyphagia and polyuria.   Genitourinary: Negative for difficulty urinating, dyspareunia, dysuria, genital sores, menstrual problem, missed menses, pelvic pain, urgency, vaginal bleeding, vaginal discharge and vaginal pain.   Musculoskeletal: Negative for gait problem and myalgias.   Skin: Negative for color change, pallor and rash.   Neurological: Negative for dizziness, weakness, light-headedness and headaches.   Hematological: Negative for adenopathy.   Psychiatric/Behavioral: Negative for agitation, confusion, dysphoric mood, self-injury and suicidal  ideas. The patient is not nervous/anxious.        Objective   Physical Exam   Constitutional: She is oriented to person, place, and time. She appears well-developed and well-nourished.   Neck: No thyromegaly present.   Cardiovascular: Normal rate, regular rhythm, normal heart sounds and intact distal pulses.    Pulmonary/Chest: Effort normal and breath sounds normal. Right breast exhibits no inverted nipple, no mass, no nipple discharge, no skin change and no tenderness. Left breast exhibits no inverted nipple, no mass, no nipple discharge, no skin change and no tenderness. Breasts are symmetrical.   Abdominal: Soft. Bowel sounds are normal. She exhibits no distension. There is no tenderness.   Genitourinary: Vagina normal and uterus normal. No breast discharge or bleeding. There is no rash, tenderness, lesion or injury on the right labia. There is no rash, tenderness, lesion or injury on the left labia. Cervix exhibits no motion tenderness, no discharge and no friability. Right adnexum displays no mass, no tenderness and no fullness. Left adnexum displays no mass, no tenderness and no fullness.   Genitourinary Comments: Pap smear and GC/CT swab obtained.   Lymphadenopathy:     She has no axillary adenopathy.        Right: No inguinal adenopathy present.        Left: No inguinal adenopathy present.   Neurological: She is alert and oriented to person, place, and time.   Skin: Skin is warm, dry and intact.   Psychiatric: She has a normal mood and affect. Her speech is normal and behavior is normal.   Nursing note and vitals reviewed.        Assessment/Plan   Marlene was seen today for gynecologic exam.    Diagnoses and all orders for this visit:    Well woman exam with routine gynecological exam  -     Liquid-based Pap Smear, Screening    Screen for sexually transmitted diseases  -     Chlamydia trachomatis, Neisseria gonorrhoeae, Trichomonas vaginalis, PCR - Swab, Vagina  -     Hepatitis C Antibody  -     HIV-1 &  HIV-2 Antibodies  -     RPR    General counseling and advice for contraceptive management    Plan for Nexplanon placement with next period. R/B/A and placement/removal discussed.

## 2018-04-28 LAB
C TRACH RRNA CVX QL NAA+PROBE: NEGATIVE
N GONORRHOEA RRNA SPEC QL NAA+PROBE: NEGATIVE
T VAGINALIS DNA VAG QL PROBE+SIG AMP: NEGATIVE

## 2018-05-01 LAB
LAB AP CASE REPORT: NORMAL
LAB AP GYN ADDITIONAL INFORMATION: NORMAL
LAB AP GYN OTHER FINDINGS: NORMAL
Lab: NORMAL
PATH INTERP SPEC-IMP: NORMAL
STAT OF ADQ CVX/VAG CYTO-IMP: NORMAL

## 2018-05-05 LAB — RPR SER QL: NORMAL

## 2018-06-18 ENCOUNTER — HOSPITAL ENCOUNTER (OUTPATIENT)
Facility: HOSPITAL | Age: 28
Setting detail: HOSPITAL OUTPATIENT SURGERY
End: 2018-06-18
Attending: SPECIALIST | Admitting: SPECIALIST

## 2018-06-21 ENCOUNTER — APPOINTMENT (OUTPATIENT)
Dept: PREADMISSION TESTING | Facility: HOSPITAL | Age: 28
End: 2018-06-21

## 2018-06-27 ENCOUNTER — OFFICE VISIT (OUTPATIENT)
Dept: OBSTETRICS AND GYNECOLOGY | Facility: CLINIC | Age: 28
End: 2018-06-27

## 2018-06-27 VITALS
WEIGHT: 208 LBS | DIASTOLIC BLOOD PRESSURE: 84 MMHG | BODY MASS INDEX: 33.43 KG/M2 | HEIGHT: 66 IN | SYSTOLIC BLOOD PRESSURE: 121 MMHG | HEART RATE: 106 BPM

## 2018-06-27 DIAGNOSIS — Z30.017 NEXPLANON INSERTION: Primary | ICD-10-CM

## 2018-06-27 PROCEDURE — 11981 INSERTION DRUG DLVR IMPLANT: CPT | Performed by: NURSE PRACTITIONER

## 2018-06-27 NOTE — PROGRESS NOTES
Nexplanon Insertion    Patient's last menstrual period was 06/20/2018.    Date of procedure:  6/27/2018    Risks and benefits discussed? yes  All questions answered? yes  Consents given by the patient  Written consent obtained? yes    Local anesthesia used:  yes - 3 cc's of  Meds; anesthesia local: 1% lidocaine    Procedure documentation:    The upper left arm (non-dominant) was marked at the intended site of insertion. The skin was cleansed with an antiseptic solution.  Local anesthesia was injected.  The Nexplanon was placed subdermally without difficulty.  The devise was able to be palpated in the arm by both myself and Marlene.  The site was cleansed then a 4x4 clean gauze was place over the site of insertion and wrapped with gauze.     She tolerated the procedure well.  There were no complications.  EBL was minimal.    Post procedure instructions: Remove the wrapping in 24 hours and cover with a  band aid if still open.    Follow up needed: PRN    This note was electronically signed.    Tiera Tavera, VICENTE  June 27, 2018

## 2019-02-15 ENCOUNTER — HOSPITAL ENCOUNTER (EMERGENCY)
Facility: HOSPITAL | Age: 29
Discharge: HOME | End: 2019-02-15
Attending: EMERGENCY MEDICINE | Admitting: EMERGENCY MEDICINE

## 2019-02-15 VITALS
WEIGHT: 205 LBS | HEIGHT: 66 IN | RESPIRATION RATE: 18 BRPM | OXYGEN SATURATION: 97 % | TEMPERATURE: 97.6 F | BODY MASS INDEX: 32.95 KG/M2 | HEART RATE: 72 BPM | DIASTOLIC BLOOD PRESSURE: 83 MMHG | SYSTOLIC BLOOD PRESSURE: 119 MMHG

## 2019-02-15 DIAGNOSIS — L03.113 CELLULITIS OF RIGHT UPPER EXTREMITY: Primary | ICD-10-CM

## 2019-02-15 PROCEDURE — 96372 THER/PROPH/DIAG INJ SC/IM: CPT

## 2019-02-15 PROCEDURE — 99283 EMERGENCY DEPT VISIT LOW MDM: CPT

## 2019-02-15 PROCEDURE — 25010000003 CEFAZOLIN PER 500 MG: Performed by: EMERGENCY MEDICINE

## 2019-02-15 RX ORDER — CEPHALEXIN 500 MG/1
500 CAPSULE ORAL 3 TIMES DAILY
Qty: 30 CAPSULE | Refills: 0 | Status: SHIPPED | OUTPATIENT
Start: 2019-02-15 | End: 2019-02-25

## 2019-02-15 RX ORDER — CEFAZOLIN SODIUM 1 G/3ML
1 INJECTION, POWDER, FOR SOLUTION INTRAMUSCULAR; INTRAVENOUS ONCE
Status: COMPLETED | OUTPATIENT
Start: 2019-02-15 | End: 2019-02-15

## 2019-02-15 RX ADMIN — CEFAZOLIN SODIUM 1 G: 1 INJECTION, POWDER, FOR SOLUTION INTRAMUSCULAR; INTRAVENOUS at 02:28

## 2019-02-15 NOTE — ED PROVIDER NOTES
Subjective   28-year-old white female presents to the emergency department with chief complaint of rash.  Patient complains of a rash to her right upper arm since yesterday.  She relates she is feeling well otherwise.  She denies fever sweats or chills.            Review of Systems   Constitutional: Negative for chills, diaphoresis and fever.   HENT: Negative for sore throat and trouble swallowing.    Respiratory: Negative for shortness of breath.    Cardiovascular: Negative for chest pain and leg swelling.   Gastrointestinal: Negative for abdominal pain, nausea and vomiting.   Genitourinary: Negative for dysuria.   Skin: Positive for rash.   Neurological: Negative for syncope, weakness and headaches.   All other systems reviewed and are negative.      Past Medical History:   Diagnosis Date   • Abrasion     Abrasion, forearm area      • Acute pharyngitis    • Acute sinusitis    • Allergic rhinitis due to pollen    • Cervicovaginal cytology normal or benign      Low grade squamous intraepithelial lesion      • Common cold    • Eruption cyst     Maculopapular eruption      • Family planning, lunelle surveillance    • Genital herpes simplex    • Headache    • Herpes    • Infestation by Sarcoptes scabiei tarik hominis    • Irregular periods    • Myalgia    • Nausea and vomiting    • Normal gynecologic examination    • Oral contraception initial prescription    • Upper respiratory infection    • Vaginal discharge        No Known Allergies    Past Surgical History:   Procedure Laterality Date   • INJECTION OF MEDICATION  10/13/2014    Kenalog(1)   • INJECTION OF MEDICATION  04/04/2015    Zofran(1)       Family History   Problem Relation Age of Onset   • Breast cancer Other    • Breast cancer Father    • Breast cancer Maternal Grandmother    • Colon cancer Neg Hx    • Endometrial cancer Neg Hx    • Ovarian cancer Neg Hx        Social History     Socioeconomic History   • Marital status: Single     Spouse name: Not on file    • Number of children: Not on file   • Years of education: Not on file   • Highest education level: Not on file   Tobacco Use   • Smoking status: Current Every Day Smoker   • Smokeless tobacco: Never Used   Substance and Sexual Activity   • Alcohol use: No   • Drug use: No   • Sexual activity: Not Currently     Partners: Male     Comment: last pap smear 7/2014 negative            Objective   Physical Exam   Constitutional: She is oriented to person, place, and time. She appears well-developed and well-nourished. No distress.   HENT:   Head: Normocephalic and atraumatic.   Right Ear: External ear normal.   Left Ear: External ear normal.   Nose: Nose normal.   Mouth/Throat: Oropharynx is clear and moist.   Eyes: Conjunctivae and EOM are normal. Pupils are equal, round, and reactive to light.   Neck: Normal range of motion. Neck supple.   Cardiovascular: Normal rate, regular rhythm, normal heart sounds and intact distal pulses.   Pulmonary/Chest: Effort normal and breath sounds normal.   Abdominal: Soft. Bowel sounds are normal. She exhibits no distension and no mass. There is no tenderness. There is no guarding.   Musculoskeletal: Normal range of motion. She exhibits no edema.   Neurological: She is alert and oriented to person, place, and time. No cranial nerve deficit or sensory deficit. She exhibits normal muscle tone.   Skin: Skin is warm and dry. She is not diaphoretic.   Erythematous macular rash right upper arm with increased warmth consistent with cellulitis.  No fluctuance or induration or sign of abscess.   Psychiatric: She has a normal mood and affect. Her behavior is normal.   Nursing note and vitals reviewed.      Procedures           ED Course                  MDM      Final diagnoses:   Cellulitis of right upper extremity            Boby Castro MD  02/15/19 0150

## 2021-01-30 ENCOUNTER — APPOINTMENT (OUTPATIENT)
Dept: CT IMAGING | Facility: HOSPITAL | Age: 31
End: 2021-01-30

## 2021-01-30 ENCOUNTER — HOSPITAL ENCOUNTER (EMERGENCY)
Facility: HOSPITAL | Age: 31
Discharge: HOME | End: 2021-01-30
Attending: STUDENT IN AN ORGANIZED HEALTH CARE EDUCATION/TRAINING PROGRAM | Admitting: STUDENT IN AN ORGANIZED HEALTH CARE EDUCATION/TRAINING PROGRAM

## 2021-01-30 VITALS
HEIGHT: 66 IN | BODY MASS INDEX: 37.48 KG/M2 | RESPIRATION RATE: 18 BRPM | OXYGEN SATURATION: 96 % | WEIGHT: 233.19 LBS | TEMPERATURE: 98.7 F | HEART RATE: 106 BPM | SYSTOLIC BLOOD PRESSURE: 122 MMHG | DIASTOLIC BLOOD PRESSURE: 70 MMHG

## 2021-01-30 DIAGNOSIS — S00.01XA ABRASION OF SCALP, INITIAL ENCOUNTER: ICD-10-CM

## 2021-01-30 DIAGNOSIS — S09.90XA INJURY OF HEAD, INITIAL ENCOUNTER: Primary | ICD-10-CM

## 2021-01-30 LAB — B-HCG UR QL: NEGATIVE

## 2021-01-30 PROCEDURE — 25010000002 TDAP 5-2.5-18.5 LF-MCG/0.5 SUSPENSION: Performed by: STUDENT IN AN ORGANIZED HEALTH CARE EDUCATION/TRAINING PROGRAM

## 2021-01-30 PROCEDURE — 90715 TDAP VACCINE 7 YRS/> IM: CPT | Performed by: STUDENT IN AN ORGANIZED HEALTH CARE EDUCATION/TRAINING PROGRAM

## 2021-01-30 PROCEDURE — 96374 THER/PROPH/DIAG INJ IV PUSH: CPT

## 2021-01-30 PROCEDURE — 96375 TX/PRO/DX INJ NEW DRUG ADDON: CPT

## 2021-01-30 PROCEDURE — 25010000002 MORPHINE PER 10 MG: Performed by: STUDENT IN AN ORGANIZED HEALTH CARE EDUCATION/TRAINING PROGRAM

## 2021-01-30 PROCEDURE — 81025 URINE PREGNANCY TEST: CPT | Performed by: STUDENT IN AN ORGANIZED HEALTH CARE EDUCATION/TRAINING PROGRAM

## 2021-01-30 PROCEDURE — 99283 EMERGENCY DEPT VISIT LOW MDM: CPT

## 2021-01-30 PROCEDURE — 70450 CT HEAD/BRAIN W/O DYE: CPT

## 2021-01-30 PROCEDURE — 70486 CT MAXILLOFACIAL W/O DYE: CPT

## 2021-01-30 PROCEDURE — 25010000002 ONDANSETRON PER 1 MG: Performed by: STUDENT IN AN ORGANIZED HEALTH CARE EDUCATION/TRAINING PROGRAM

## 2021-01-30 PROCEDURE — 90471 IMMUNIZATION ADMIN: CPT | Performed by: STUDENT IN AN ORGANIZED HEALTH CARE EDUCATION/TRAINING PROGRAM

## 2021-01-30 RX ORDER — ONDANSETRON 2 MG/ML
4 INJECTION INTRAMUSCULAR; INTRAVENOUS ONCE
Status: COMPLETED | OUTPATIENT
Start: 2021-01-30 | End: 2021-01-30

## 2021-01-30 RX ADMIN — TETANUS TOXOID, REDUCED DIPHTHERIA TOXOID AND ACELLULAR PERTUSSIS VACCINE, ADSORBED 0.5 ML: 5; 2.5; 8; 8; 2.5 SUSPENSION INTRAMUSCULAR at 10:28

## 2021-01-30 RX ADMIN — ONDANSETRON HYDROCHLORIDE 4 MG: 2 INJECTION, SOLUTION INTRAMUSCULAR; INTRAVENOUS at 09:18

## 2021-01-30 RX ADMIN — MORPHINE SULFATE 4 MG: 4 INJECTION INTRAVENOUS at 09:18

## 2021-03-21 ENCOUNTER — HOSPITAL ENCOUNTER (EMERGENCY)
Facility: HOSPITAL | Age: 31
Discharge: HOME | End: 2021-03-21
Attending: EMERGENCY MEDICINE | Admitting: EMERGENCY MEDICINE

## 2021-03-21 ENCOUNTER — APPOINTMENT (OUTPATIENT)
Dept: CT IMAGING | Facility: HOSPITAL | Age: 31
End: 2021-03-21

## 2021-03-21 VITALS
SYSTOLIC BLOOD PRESSURE: 131 MMHG | HEART RATE: 88 BPM | DIASTOLIC BLOOD PRESSURE: 74 MMHG | TEMPERATURE: 98.9 F | BODY MASS INDEX: 34.06 KG/M2 | RESPIRATION RATE: 16 BRPM | OXYGEN SATURATION: 98 % | WEIGHT: 217 LBS | HEIGHT: 67 IN

## 2021-03-21 DIAGNOSIS — M54.50 ACUTE BILATERAL LOW BACK PAIN WITHOUT SCIATICA: Primary | ICD-10-CM

## 2021-03-21 LAB
ALBUMIN SERPL-MCNC: 4.2 G/DL (ref 3.5–5.2)
ALBUMIN/GLOB SERPL: 1.3 G/DL
ALP SERPL-CCNC: 70 U/L (ref 39–117)
ALT SERPL W P-5'-P-CCNC: 16 U/L (ref 1–33)
ANION GAP SERPL CALCULATED.3IONS-SCNC: 10 MMOL/L (ref 5–15)
AST SERPL-CCNC: 15 U/L (ref 1–32)
B-HCG UR QL: NEGATIVE
BASOPHILS # BLD AUTO: 0.06 10*3/MM3 (ref 0–0.2)
BASOPHILS NFR BLD AUTO: 0.5 % (ref 0–1.5)
BILIRUB SERPL-MCNC: 0.5 MG/DL (ref 0–1.2)
BILIRUB UR QL STRIP: NEGATIVE
BUN SERPL-MCNC: 8 MG/DL (ref 6–20)
BUN/CREAT SERPL: 12.5 (ref 7–25)
CALCIUM SPEC-SCNC: 8.9 MG/DL (ref 8.6–10.5)
CHLORIDE SERPL-SCNC: 105 MMOL/L (ref 98–107)
CLARITY UR: ABNORMAL
CO2 SERPL-SCNC: 24 MMOL/L (ref 22–29)
COLOR UR: YELLOW
CREAT SERPL-MCNC: 0.64 MG/DL (ref 0.57–1)
DEPRECATED RDW RBC AUTO: 36.2 FL (ref 37–54)
EOSINOPHIL # BLD AUTO: 0.17 10*3/MM3 (ref 0–0.4)
EOSINOPHIL NFR BLD AUTO: 1.3 % (ref 0.3–6.2)
ERYTHROCYTE [DISTWIDTH] IN BLOOD BY AUTOMATED COUNT: 12.1 % (ref 12.3–15.4)
GFR SERPL CREATININE-BSD FRML MDRD: 110 ML/MIN/1.73
GLOBULIN UR ELPH-MCNC: 3.3 GM/DL
GLUCOSE SERPL-MCNC: 112 MG/DL (ref 65–99)
GLUCOSE UR STRIP-MCNC: NEGATIVE MG/DL
HCT VFR BLD AUTO: 40.8 % (ref 34–46.6)
HGB BLD-MCNC: 14.2 G/DL (ref 12–15.9)
HGB UR QL STRIP.AUTO: NEGATIVE
HOLD SPECIMEN: NORMAL
IMM GRANULOCYTES # BLD AUTO: 0.04 10*3/MM3 (ref 0–0.05)
IMM GRANULOCYTES NFR BLD AUTO: 0.3 % (ref 0–0.5)
KETONES UR QL STRIP: NEGATIVE
LEUKOCYTE ESTERASE UR QL STRIP.AUTO: NEGATIVE
LYMPHOCYTES # BLD AUTO: 1.61 10*3/MM3 (ref 0.7–3.1)
LYMPHOCYTES NFR BLD AUTO: 12.4 % (ref 19.6–45.3)
MCH RBC QN AUTO: 28.7 PG (ref 26.6–33)
MCHC RBC AUTO-ENTMCNC: 34.8 G/DL (ref 31.5–35.7)
MCV RBC AUTO: 82.6 FL (ref 79–97)
MONOCYTES # BLD AUTO: 0.62 10*3/MM3 (ref 0.1–0.9)
MONOCYTES NFR BLD AUTO: 4.8 % (ref 5–12)
NEUTROPHILS NFR BLD AUTO: 10.44 10*3/MM3 (ref 1.7–7)
NEUTROPHILS NFR BLD AUTO: 80.7 % (ref 42.7–76)
NITRITE UR QL STRIP: NEGATIVE
NRBC BLD AUTO-RTO: 0 /100 WBC (ref 0–0.2)
PH UR STRIP.AUTO: 7 [PH] (ref 5–9)
PLATELET # BLD AUTO: 272 10*3/MM3 (ref 140–450)
PMV BLD AUTO: 11.4 FL (ref 6–12)
POTASSIUM SERPL-SCNC: 3.8 MMOL/L (ref 3.5–5.2)
PROT SERPL-MCNC: 7.5 G/DL (ref 6–8.5)
PROT UR QL STRIP: NEGATIVE
RBC # BLD AUTO: 4.94 10*6/MM3 (ref 3.77–5.28)
SODIUM SERPL-SCNC: 139 MMOL/L (ref 136–145)
SP GR UR STRIP: 1.01 (ref 1–1.03)
UROBILINOGEN UR QL STRIP: ABNORMAL
WBC # BLD AUTO: 12.94 10*3/MM3 (ref 3.4–10.8)
WHOLE BLOOD HOLD SPECIMEN: NORMAL

## 2021-03-21 PROCEDURE — 96375 TX/PRO/DX INJ NEW DRUG ADDON: CPT

## 2021-03-21 PROCEDURE — 80053 COMPREHEN METABOLIC PANEL: CPT | Performed by: NURSE PRACTITIONER

## 2021-03-21 PROCEDURE — 96374 THER/PROPH/DIAG INJ IV PUSH: CPT

## 2021-03-21 PROCEDURE — 25010000002 KETOROLAC TROMETHAMINE PER 15 MG: Performed by: NURSE PRACTITIONER

## 2021-03-21 PROCEDURE — 25010000002 ORPHENADRINE CITRATE PER 60 MG: Performed by: NURSE PRACTITIONER

## 2021-03-21 PROCEDURE — 99284 EMERGENCY DEPT VISIT MOD MDM: CPT

## 2021-03-21 PROCEDURE — 81025 URINE PREGNANCY TEST: CPT | Performed by: NURSE PRACTITIONER

## 2021-03-21 PROCEDURE — 81003 URINALYSIS AUTO W/O SCOPE: CPT | Performed by: NURSE PRACTITIONER

## 2021-03-21 PROCEDURE — 74176 CT ABD & PELVIS W/O CONTRAST: CPT

## 2021-03-21 PROCEDURE — 85025 COMPLETE CBC W/AUTO DIFF WBC: CPT | Performed by: NURSE PRACTITIONER

## 2021-03-21 RX ORDER — ORPHENADRINE CITRATE 30 MG/ML
60 INJECTION INTRAMUSCULAR; INTRAVENOUS ONCE
Status: COMPLETED | OUTPATIENT
Start: 2021-03-21 | End: 2021-03-21

## 2021-03-21 RX ORDER — KETOROLAC TROMETHAMINE 30 MG/ML
60 INJECTION, SOLUTION INTRAMUSCULAR; INTRAVENOUS ONCE
Status: DISCONTINUED | OUTPATIENT
Start: 2021-03-21 | End: 2021-03-21

## 2021-03-21 RX ORDER — ORPHENADRINE CITRATE 100 MG/1
100 TABLET, EXTENDED RELEASE ORAL 2 TIMES DAILY PRN
Qty: 8 TABLET | Refills: 0 | OUTPATIENT
Start: 2021-03-21 | End: 2021-06-12

## 2021-03-21 RX ORDER — SODIUM CHLORIDE 0.9 % (FLUSH) 0.9 %
10 SYRINGE (ML) INJECTION AS NEEDED
Status: DISCONTINUED | OUTPATIENT
Start: 2021-03-21 | End: 2021-03-21 | Stop reason: HOSPADM

## 2021-03-21 RX ORDER — KETOROLAC TROMETHAMINE 30 MG/ML
30 INJECTION, SOLUTION INTRAMUSCULAR; INTRAVENOUS ONCE
Status: COMPLETED | OUTPATIENT
Start: 2021-03-21 | End: 2021-03-21

## 2021-03-21 RX ADMIN — ORPHENADRINE CITRATE 60 MG: 60 INJECTION INTRAMUSCULAR; INTRAVENOUS at 13:42

## 2021-03-21 RX ADMIN — KETOROLAC TROMETHAMINE 30 MG: 30 INJECTION, SOLUTION INTRAMUSCULAR; INTRAVENOUS at 12:44

## 2021-03-21 NOTE — DISCHARGE INSTRUCTIONS
May apply cool compresses to bilateral lower back as needed for comfort. Follow up with [facility] if symptoms continue or do not improve. Return to ER as needed.

## 2021-03-21 NOTE — ED PROVIDER NOTES
Subjective   Patient presents to the ER with c/o bilateral lower back pain that started at 0300 this AM. She states the pain is constant but varies in intensity. Worse with movement. She states she has had some lower abdominal pain over the bladder region with dysuria. Denies N/V/D.           Review of Systems   Constitutional: Negative for chills and fever.   HENT: Negative.    Respiratory: Negative for cough and shortness of breath.    Cardiovascular: Negative for chest pain and palpitations.   Gastrointestinal: Positive for abdominal pain. Negative for diarrhea, nausea and vomiting.   Genitourinary: Positive for dysuria, flank pain, frequency and vaginal bleeding. Negative for difficulty urinating, pelvic pain, vaginal discharge and vaginal pain.       Past Medical History:   Diagnosis Date   • Abrasion     Abrasion, forearm area      • Acute pharyngitis    • Acute sinusitis    • Allergic rhinitis due to pollen    • Cervicovaginal cytology normal or benign      Low grade squamous intraepithelial lesion      • Common cold    • Eruption cyst     Maculopapular eruption      • Family planning, lunelle surveillance    • Genital herpes simplex    • Headache    • Herpes    • Infestation by Sarcoptes scabiei tarik hominis    • Irregular periods    • Myalgia    • Nausea and vomiting    • Normal gynecologic examination    • Oral contraception initial prescription    • Upper respiratory infection    • Vaginal discharge        No Known Allergies    Past Surgical History:   Procedure Laterality Date   • DENTAL PROCEDURE     • INJECTION OF MEDICATION  10/13/2014    Kenalog(1)   • INJECTION OF MEDICATION  04/04/2015    Zofran(1)       Family History   Problem Relation Age of Onset   • Breast cancer Other    • Breast cancer Father    • Breast cancer Maternal Grandmother    • Colon cancer Neg Hx    • Endometrial cancer Neg Hx    • Ovarian cancer Neg Hx        Social History     Socioeconomic History   • Marital status: Single      "Spouse name: Not on file   • Number of children: Not on file   • Years of education: Not on file   • Highest education level: Not on file   Tobacco Use   • Smoking status: Never Smoker   • Smokeless tobacco: Never Used   • Tobacco comment: stopped 1 year ago   Substance and Sexual Activity   • Alcohol use: No   • Drug use: No   • Sexual activity: Not Currently     Partners: Male     Comment: last pap smear 7/2014 negative            Objective    /74 (BP Location: Right arm, Patient Position: Lying)   Pulse 88   Temp 98.9 °F (37.2 °C) (Oral)   Resp 16   Ht 170.2 cm (67\")   Wt 98.4 kg (217 lb)   LMP  (LMP Unknown)   SpO2 98%   Breastfeeding No   BMI 33.99 kg/m²     Physical Exam  Vitals and nursing note reviewed.   Constitutional:       General: She is not in acute distress.     Appearance: She is well-developed.   HENT:      Head: Normocephalic and atraumatic.   Cardiovascular:      Rate and Rhythm: Normal rate and regular rhythm.      Heart sounds: Normal heart sounds. No murmur heard.     Pulmonary:      Effort: Pulmonary effort is normal. No respiratory distress.      Breath sounds: Normal breath sounds. No wheezing.   Abdominal:      General: Bowel sounds are normal. There is no distension.      Palpations: Abdomen is soft.      Tenderness: There is abdominal tenderness in the right lower quadrant, suprapubic area and left lower quadrant.   Musculoskeletal:         General: Tenderness (bilateral low back with palpation and movment) present. Normal range of motion.      Cervical back: Normal range of motion and neck supple.   Skin:     General: Skin is warm and dry.      Capillary Refill: Capillary refill takes less than 2 seconds.   Neurological:      Mental Status: She is alert and oriented to person, place, and time.      Motor: No weakness.      Coordination: Coordination normal.      Gait: Gait normal.      Deep Tendon Reflexes: Reflexes normal.   Psychiatric:         Behavior: Behavior normal. "         Thought Content: Thought content normal.         Judgment: Judgment normal.         Procedures  Results for orders placed or performed during the hospital encounter of 03/21/21   Urinalysis With Microscopic If Indicated (No Culture) - Urine, Clean Catch    Specimen: Urine, Clean Catch   Result Value Ref Range    Color, UA Yellow Yellow, Straw, Dark Yellow, Josefina    Appearance, UA Cloudy (A) Clear    pH, UA 7.0 5.0 - 9.0    Specific Gravity, UA 1.009 1.003 - 1.030    Glucose, UA Negative Negative    Ketones, UA Negative Negative    Bilirubin, UA Negative Negative    Blood, UA Negative Negative    Protein, UA Negative Negative    Leuk Esterase, UA Negative Negative    Nitrite, UA Negative Negative    Urobilinogen, UA 0.2 E.U./dL 0.2 - 1.0 E.U./dL   Pregnancy, Urine - Urine, Clean Catch    Specimen: Urine, Clean Catch   Result Value Ref Range    HCG, Urine QL Negative Negative   Comprehensive Metabolic Panel    Specimen: Blood   Result Value Ref Range    Glucose 112 (H) 65 - 99 mg/dL    BUN 8 6 - 20 mg/dL    Creatinine 0.64 0.57 - 1.00 mg/dL    Sodium 139 136 - 145 mmol/L    Potassium 3.8 3.5 - 5.2 mmol/L    Chloride 105 98 - 107 mmol/L    CO2 24.0 22.0 - 29.0 mmol/L    Calcium 8.9 8.6 - 10.5 mg/dL    Total Protein 7.5 6.0 - 8.5 g/dL    Albumin 4.20 3.50 - 5.20 g/dL    ALT (SGPT) 16 1 - 33 U/L    AST (SGOT) 15 1 - 32 U/L    Alkaline Phosphatase 70 39 - 117 U/L    Total Bilirubin 0.5 0.0 - 1.2 mg/dL    eGFR Non African Amer 110 >60 mL/min/1.73    Globulin 3.3 gm/dL    A/G Ratio 1.3 g/dL    BUN/Creatinine Ratio 12.5 7.0 - 25.0    Anion Gap 10.0 5.0 - 15.0 mmol/L   CBC Auto Differential    Specimen: Blood   Result Value Ref Range    WBC 12.94 (H) 3.40 - 10.80 10*3/mm3    RBC 4.94 3.77 - 5.28 10*6/mm3    Hemoglobin 14.2 12.0 - 15.9 g/dL    Hematocrit 40.8 34.0 - 46.6 %    MCV 82.6 79.0 - 97.0 fL    MCH 28.7 26.6 - 33.0 pg    MCHC 34.8 31.5 - 35.7 g/dL    RDW 12.1 (L) 12.3 - 15.4 %    RDW-SD 36.2 (L) 37.0 - 54.0 fl     MPV 11.4 6.0 - 12.0 fL    Platelets 272 140 - 450 10*3/mm3    Neutrophil % 80.7 (H) 42.7 - 76.0 %    Lymphocyte % 12.4 (L) 19.6 - 45.3 %    Monocyte % 4.8 (L) 5.0 - 12.0 %    Eosinophil % 1.3 0.3 - 6.2 %    Basophil % 0.5 0.0 - 1.5 %    Immature Grans % 0.3 0.0 - 0.5 %    Neutrophils, Absolute 10.44 (H) 1.70 - 7.00 10*3/mm3    Lymphocytes, Absolute 1.61 0.70 - 3.10 10*3/mm3    Monocytes, Absolute 0.62 0.10 - 0.90 10*3/mm3    Eosinophils, Absolute 0.17 0.00 - 0.40 10*3/mm3    Basophils, Absolute 0.06 0.00 - 0.20 10*3/mm3    Immature Grans, Absolute 0.04 0.00 - 0.05 10*3/mm3    nRBC 0.0 0.0 - 0.2 /100 WBC   Light Blue Top   Result Value Ref Range    Extra Tube hold for add-on    Gold Top - SST   Result Value Ref Range    Extra Tube Hold for add-ons.      CT Abdomen Pelvis Without Contrast    Result Date: 3/21/2021  Narrative: CT Abdomen and Pelvis Without Contrast History: Flank pain. Dysuria. Axials spiral scans of the abdomen and pelvis were obtained without contrast.  Coronal and sagital reconstructions were preformed.  This exam was performed according to our departmental dose-optimization program, which includes automated exposure control, adjustment of the mA and/or kV according to patient size and/or use of iterative reconstruction technique. DLP: 606.70 Comparison: None Findings: Scans of the lung bases are unremarkable. No acute osseous abnormality. 2.5 cm hepatic cyst. The gallbladder is unremarkable. The spleen is unremarkable. The pancreas is unremarkable. The adrenal glands are unremarkable. No renal or ureteral calculi. No renal mass. No hydronephrosis. Unremarkable bladder. No pelvic mass. No abdominal aortic aneurysm. No adenopathy. Moderate amount retained feces in the colon. No bowel obstruction. Normal appendix. No free air. No free fluid. No hernia.     Impression: Conclusion: No calculi or obstructive uropathy. Unremarkable urinary bladder. 2.5 cm hepatic cyst. Moderate amount retained feces in  the colon. [id number] Electronically signed by:  Erick Fajardo MD  3/21/2021 1:50 PM CDT Workstation: [id number]             ED Course  ED Course as of Mar 21 1408   Sun Mar 21, 2021   1405 In to speak with patient. Work up discussed. Advised to follow up with PCP for further evaluation. Patient verbalized understanding.     [SH]      ED Course User Index  [SH] Natalia Byers APRN                                           MDM    Final diagnoses:   Acute bilateral low back pain without sciatica       ED Disposition  ED Disposition     ED Disposition Condition Comment    Discharge Stable           [facility]  [address]   [facility] [address] [phone]  Schedule an appointment as soon as possible for a visit   ER follow up         Medication List      New Prescriptions    diclofenac 50 MG EC tablet  Commonly known as: VOLTAREN  Take 1 tablet by mouth 3 (Three) Times a Day As Needed (back pain).     orphenadrine 100 MG 12 hr tablet  Commonly known as: NORFLEX  Take 1 tablet by mouth 2 (Two) Times a Day As Needed (back pain).        Stop    promethazine-dextromethorphan 6.25-15 MG/5ML syrup  Commonly known as: PROMETHAZINE-DM           Where to Get Your Medications      These medications were sent to [id number] - [place], [place] - [address] AT [facility] [address] - [phone]  - [phone] [address] [address]    Phone: [phone]   · diclofenac 50 MG EC tablet  · orphenadrine 100 MG 12 hr tablet          Natalia Byers APRN  03/21/21 5488

## 2021-07-20 ENCOUNTER — PROCEDURE VISIT (OUTPATIENT)
Dept: OBSTETRICS AND GYNECOLOGY | Facility: CLINIC | Age: 31
End: 2021-07-20

## 2021-07-20 VITALS
BODY MASS INDEX: 36.32 KG/M2 | SYSTOLIC BLOOD PRESSURE: 104 MMHG | WEIGHT: 226 LBS | HEIGHT: 66 IN | DIASTOLIC BLOOD PRESSURE: 70 MMHG

## 2021-07-20 DIAGNOSIS — Z30.46 ENCOUNTER FOR NEXPLANON REMOVAL: Primary | ICD-10-CM

## 2021-07-20 DIAGNOSIS — Z30.011 BCP (BIRTH CONTROL PILLS) INITIATION: ICD-10-CM

## 2021-07-20 PROCEDURE — 99213 OFFICE O/P EST LOW 20 MIN: CPT | Performed by: NURSE PRACTITIONER

## 2021-07-20 RX ORDER — LEVONORGESTREL AND ETHINYL ESTRADIOL 0.15-0.03
1 KIT ORAL DAILY
Qty: 28 TABLET | Refills: 12 | Status: SHIPPED | OUTPATIENT
Start: 2021-07-20 | End: 2021-08-13

## 2021-07-20 NOTE — PROGRESS NOTES
Subjective   Marlene Barbosa is a 30 y.o. desires Nexplanon removal and discuss birth control options    Marlene Barbosa is a 30 yr old  who presents today for Nexplanon removal and discuss BC options. Nexplanon is . Inquiring about the Mirena. Unsure of last Pap.       The following portions of the patient's history were reviewed and updated as appropriate: allergies, current medications, past family history, past medical history, past social history, past surgical history and problem list.    Review of Systems   Constitutional: Negative for chills, fatigue and fever.   Respiratory: Negative for shortness of breath.    Cardiovascular: Negative for chest pain and palpitations.   Gastrointestinal: Negative for abdominal pain, constipation, diarrhea and nausea.   Genitourinary: Negative for dysuria, frequency, menstrual problem, pelvic pressure, vaginal bleeding, vaginal discharge and vaginal pain.   Skin: Negative for rash.   Neurological: Negative for headache.   Psychiatric/Behavioral: Negative for depressed mood.       Objective   Physical Exam  Vitals and nursing note reviewed.   Constitutional:       Appearance: She is well-developed.   Neck:      Thyroid: No thyromegaly.   Pulmonary:      Effort: Pulmonary effort is normal. No respiratory distress.   Abdominal:      Palpations: Abdomen is soft.   Musculoskeletal:         General: Normal range of motion.      Cervical back: Normal range of motion and neck supple.   Skin:     General: Skin is warm and dry.   Neurological:      Mental Status: She is alert and oriented to person, place, and time.   Psychiatric:         Behavior: Behavior normal.           Assessment/Plan   Diagnoses and all orders for this visit:    1. Encounter for Nexplanon removal (Primary)    2. BCP (birth control pills) initiation    Other orders  -     levonorgestrel-ethinyl estradiol (NORDETTE) 0.15-30 MG-MCG per tablet; Take 1 tablet by mouth Daily.  Dispense: 28  tablet; Refill: 12        Counseling on Mirena, Kyleena, and Paragard provided; brochures given. Pt will think about IUD but in the meantime desires birth control pills. Denies smoking, hx of DVT/PE or clotting disorders, or migraines with aura. Instructions given on OCPs. Return for well woman/pap as soon as possible.          Nexplanon Removal    Date of procedure:  7/20/2021    Risks and benefits discussed? yes  All questions answered? yes  Consents given by the patient  Written consent obtained? yes    Local anesthesia used:  yes - 3 cc's of  Meds; anesthesia local: None, 1% lidocaine with epinephrine    Procedure documentation:    The upper left arm (non-dominant) was marked at the intended site of removal.  The skin was cleansed with an antispetic solution.  Local anesthesia was injected.  A vertical horizontal was created at the distal tip of the implant.  The implant was removed intact without difficulty.  A 4x4 sterile gauze was placed over the incision site and the arm was wrapped with gauze.  She tolerated the procedure well.  There were no complications.  EBL was minimal.    Post procedure instructions: Remove the wrapping in 24 hours and cover with a  band-aid if still open.    Follow up needed: PRN        Diagnoses and all orders for this visit:    1. Encounter for Nexplanon removal (Primary)    2. BCP (birth control pills) initiation    Other orders  -     levonorgestrel-ethinyl estradiol (NORDETTE) 0.15-30 MG-MCG per tablet; Take 1 tablet by mouth Daily.  Dispense: 28 tablet; Refill: 12        This note was electronically signed.    Juli Ling, APRN  July 20, 2021

## 2021-08-06 ENCOUNTER — APPOINTMENT (OUTPATIENT)
Dept: GENERAL RADIOLOGY | Facility: HOSPITAL | Age: 31
End: 2021-08-06

## 2021-08-06 ENCOUNTER — HOSPITAL ENCOUNTER (EMERGENCY)
Facility: HOSPITAL | Age: 31
Discharge: HOME | End: 2021-08-06
Attending: EMERGENCY MEDICINE | Admitting: EMERGENCY MEDICINE

## 2021-08-06 VITALS
HEART RATE: 76 BPM | HEIGHT: 66 IN | TEMPERATURE: 98 F | WEIGHT: 229 LBS | DIASTOLIC BLOOD PRESSURE: 58 MMHG | RESPIRATION RATE: 20 BRPM | SYSTOLIC BLOOD PRESSURE: 113 MMHG | BODY MASS INDEX: 36.8 KG/M2 | OXYGEN SATURATION: 97 %

## 2021-08-06 DIAGNOSIS — R07.89 ATYPICAL CHEST PAIN: Primary | ICD-10-CM

## 2021-08-06 LAB
ALBUMIN SERPL-MCNC: 3.8 G/DL (ref 3.5–5.2)
ALBUMIN/GLOB SERPL: 1.3 G/DL
ALP SERPL-CCNC: 47 U/L (ref 39–117)
ALT SERPL W P-5'-P-CCNC: 17 U/L (ref 1–33)
ANION GAP SERPL CALCULATED.3IONS-SCNC: 11 MMOL/L (ref 5–15)
AST SERPL-CCNC: 13 U/L (ref 1–32)
BASOPHILS # BLD AUTO: 0.04 10*3/MM3 (ref 0–0.2)
BASOPHILS NFR BLD AUTO: 0.4 % (ref 0–1.5)
BILIRUB SERPL-MCNC: 0.2 MG/DL (ref 0–1.2)
BUN SERPL-MCNC: 6 MG/DL (ref 6–20)
BUN/CREAT SERPL: 9.7 (ref 7–25)
CALCIUM SPEC-SCNC: 9 MG/DL (ref 8.6–10.5)
CHLORIDE SERPL-SCNC: 103 MMOL/L (ref 98–107)
CO2 SERPL-SCNC: 24 MMOL/L (ref 22–29)
CREAT SERPL-MCNC: 0.62 MG/DL (ref 0.57–1)
D-DIMER, QUANTITATIVE (MAD,POW, STR): 386 NG/ML (FEU) (ref 0–470)
DEPRECATED RDW RBC AUTO: 37.3 FL (ref 37–54)
EOSINOPHIL # BLD AUTO: 0.3 10*3/MM3 (ref 0–0.4)
EOSINOPHIL NFR BLD AUTO: 2.7 % (ref 0.3–6.2)
ERYTHROCYTE [DISTWIDTH] IN BLOOD BY AUTOMATED COUNT: 12.4 % (ref 12.3–15.4)
GFR SERPL CREATININE-BSD FRML MDRD: 114 ML/MIN/1.73
GLOBULIN UR ELPH-MCNC: 3 GM/DL
GLUCOSE SERPL-MCNC: 114 MG/DL (ref 65–99)
HCG SERPL QL: NEGATIVE
HCT VFR BLD AUTO: 35.4 % (ref 34–46.6)
HGB BLD-MCNC: 12.1 G/DL (ref 12–15.9)
IMM GRANULOCYTES # BLD AUTO: 0.02 10*3/MM3 (ref 0–0.05)
IMM GRANULOCYTES NFR BLD AUTO: 0.2 % (ref 0–0.5)
LYMPHOCYTES # BLD AUTO: 2.75 10*3/MM3 (ref 0.7–3.1)
LYMPHOCYTES NFR BLD AUTO: 25 % (ref 19.6–45.3)
MCH RBC QN AUTO: 28.5 PG (ref 26.6–33)
MCHC RBC AUTO-ENTMCNC: 34.2 G/DL (ref 31.5–35.7)
MCV RBC AUTO: 83.5 FL (ref 79–97)
MONOCYTES # BLD AUTO: 0.68 10*3/MM3 (ref 0.1–0.9)
MONOCYTES NFR BLD AUTO: 6.2 % (ref 5–12)
NEUTROPHILS NFR BLD AUTO: 65.5 % (ref 42.7–76)
NEUTROPHILS NFR BLD AUTO: 7.23 10*3/MM3 (ref 1.7–7)
NRBC BLD AUTO-RTO: 0 /100 WBC (ref 0–0.2)
PLATELET # BLD AUTO: 237 10*3/MM3 (ref 140–450)
PMV BLD AUTO: 12 FL (ref 6–12)
POTASSIUM SERPL-SCNC: 3.4 MMOL/L (ref 3.5–5.2)
PROT SERPL-MCNC: 6.8 G/DL (ref 6–8.5)
RBC # BLD AUTO: 4.24 10*6/MM3 (ref 3.77–5.28)
SODIUM SERPL-SCNC: 138 MMOL/L (ref 136–145)
TROPONIN T SERPL-MCNC: <0.01 NG/ML (ref 0–0.03)
TROPONIN T SERPL-MCNC: <0.01 NG/ML (ref 0–0.03)
WBC # BLD AUTO: 11.02 10*3/MM3 (ref 3.4–10.8)

## 2021-08-06 PROCEDURE — 93010 ELECTROCARDIOGRAM REPORT: CPT | Performed by: INTERNAL MEDICINE

## 2021-08-06 PROCEDURE — 85025 COMPLETE CBC W/AUTO DIFF WBC: CPT | Performed by: EMERGENCY MEDICINE

## 2021-08-06 PROCEDURE — 80053 COMPREHEN METABOLIC PANEL: CPT | Performed by: EMERGENCY MEDICINE

## 2021-08-06 PROCEDURE — 99284 EMERGENCY DEPT VISIT MOD MDM: CPT

## 2021-08-06 PROCEDURE — 93005 ELECTROCARDIOGRAM TRACING: CPT | Performed by: EMERGENCY MEDICINE

## 2021-08-06 PROCEDURE — 85379 FIBRIN DEGRADATION QUANT: CPT | Performed by: EMERGENCY MEDICINE

## 2021-08-06 PROCEDURE — 84484 ASSAY OF TROPONIN QUANT: CPT | Performed by: EMERGENCY MEDICINE

## 2021-08-06 PROCEDURE — 25010000002 KETOROLAC TROMETHAMINE PER 15 MG: Performed by: EMERGENCY MEDICINE

## 2021-08-06 PROCEDURE — 71045 X-RAY EXAM CHEST 1 VIEW: CPT

## 2021-08-06 PROCEDURE — 84703 CHORIONIC GONADOTROPIN ASSAY: CPT | Performed by: EMERGENCY MEDICINE

## 2021-08-06 PROCEDURE — 96374 THER/PROPH/DIAG INJ IV PUSH: CPT

## 2021-08-06 RX ORDER — SODIUM CHLORIDE 0.9 % (FLUSH) 0.9 %
10 SYRINGE (ML) INJECTION AS NEEDED
Status: DISCONTINUED | OUTPATIENT
Start: 2021-08-06 | End: 2021-08-06 | Stop reason: HOSPADM

## 2021-08-06 RX ORDER — KETOROLAC TROMETHAMINE 30 MG/ML
30 INJECTION, SOLUTION INTRAMUSCULAR; INTRAVENOUS ONCE
Status: COMPLETED | OUTPATIENT
Start: 2021-08-06 | End: 2021-08-06

## 2021-08-06 RX ORDER — POTASSIUM CHLORIDE 750 MG/1
20 CAPSULE, EXTENDED RELEASE ORAL ONCE
Status: COMPLETED | OUTPATIENT
Start: 2021-08-06 | End: 2021-08-06

## 2021-08-06 RX ADMIN — POTASSIUM CHLORIDE 20 MEQ: 750 CAPSULE, EXTENDED RELEASE ORAL at 04:01

## 2021-08-06 RX ADMIN — KETOROLAC TROMETHAMINE 30 MG: 30 INJECTION, SOLUTION INTRAMUSCULAR; INTRAVENOUS at 04:02

## 2021-08-06 NOTE — ED PROVIDER NOTES
"Subjective   30-year-old white female presents to the emergency department chief complaint of chest pain. Patient complains of intermittent chest pain that started a few days ago. Patient states \"it feels like pressure and like my heart's beating so fast.\" She rates the pain as 7 out of 10 severity. The pain does not radiate. Nothing makes the pain better nothing makes the pain worse. She denies shortness of breath, nausea, vomiting, or diaphoresis. Patient has no known cardiac risk factors.          Review of Systems   Constitutional: Negative for chills, diaphoresis and fever.   Respiratory: Negative for cough and shortness of breath.    Cardiovascular: Positive for chest pain and palpitations. Negative for leg swelling.   Gastrointestinal: Negative for abdominal pain, nausea and vomiting.   Genitourinary: Negative for dysuria.   Musculoskeletal: Negative for back pain, gait problem and neck pain.   Neurological: Negative for syncope, weakness and headaches.   Psychiatric/Behavioral: Negative for suicidal ideas.   All other systems reviewed and are negative.      Past Medical History:   Diagnosis Date   • Abrasion     Abrasion, forearm area      • Acute pharyngitis    • Acute sinusitis    • Allergic rhinitis due to pollen    • Cervicovaginal cytology normal or benign      Low grade squamous intraepithelial lesion      • Common cold    • Eruption cyst     Maculopapular eruption      • Family planning, lunelle surveillance    • Genital herpes simplex    • Headache    • Herpes    • Infestation by Sarcoptes scabiei tarik hominis    • Irregular periods    • Myalgia    • Nausea and vomiting    • Normal gynecologic examination    • Oral contraception initial prescription    • Upper respiratory infection    • Vaginal discharge        No Known Allergies    Past Surgical History:   Procedure Laterality Date   • DENTAL PROCEDURE     • INJECTION OF MEDICATION  10/13/2014    Kenalog(1)   • INJECTION OF MEDICATION  04/04/2015    " Zofran(1)       Family History   Problem Relation Age of Onset   • Breast cancer Other    • Breast cancer Father    • Breast cancer Maternal Grandmother    • Colon cancer Neg Hx    • Endometrial cancer Neg Hx    • Ovarian cancer Neg Hx        Social History     Socioeconomic History   • Marital status: Single     Spouse name: Not on file   • Number of children: Not on file   • Years of education: Not on file   • Highest education level: Not on file   Tobacco Use   • Smoking status: Never Smoker   • Smokeless tobacco: Never Used   • Tobacco comment: stopped 1 year ago   Substance and Sexual Activity   • Alcohol use: No   • Drug use: No   • Sexual activity: Not Currently     Partners: Male     Comment: last pap smear 7/2014 negative            Objective   Physical Exam  Vitals and nursing note reviewed.   Constitutional:       General: She is not in acute distress.     Appearance: She is not toxic-appearing or diaphoretic.   HENT:      Head: Normocephalic and atraumatic.      Right Ear: External ear normal.      Left Ear: External ear normal.      Nose: Nose normal.      Mouth/Throat:      Mouth: Mucous membranes are moist.      Pharynx: Oropharynx is clear.   Eyes:      Extraocular Movements: Extraocular movements intact.      Conjunctiva/sclera: Conjunctivae normal.      Pupils: Pupils are equal, round, and reactive to light.   Cardiovascular:      Rate and Rhythm: Normal rate and regular rhythm.      Pulses: Normal pulses.      Heart sounds: Normal heart sounds.   Pulmonary:      Effort: Pulmonary effort is normal.      Breath sounds: Normal breath sounds.   Abdominal:      General: Bowel sounds are normal. There is no distension.      Palpations: Abdomen is soft. There is no mass.      Tenderness: There is no abdominal tenderness. There is no guarding.   Musculoskeletal:      Cervical back: Normal range of motion and neck supple.      Right lower leg: No edema.      Left lower leg: No edema.   Skin:     General:  Skin is warm and dry.   Neurological:      General: No focal deficit present.      Mental Status: She is alert and oriented to person, place, and time.   Psychiatric:         Mood and Affect: Mood normal.         Behavior: Behavior normal.         ECG 12 Lead      Date/Time: 8/6/2021 2:06 AM  Performed by: Boby Castro MD  Authorized by: Boby Castro MD   Interpreted by physician  Rhythm: sinus rhythm  Rate: normal  BPM: 83  QRS axis: right  Conduction: conduction normal  ST Segments: ST segments normal  T Waves: T waves normal  Clinical impression: non-specific ECG                 ED Course  ED Course as of Aug 06 0522   Fri Aug 06, 2021   0521 Patient is alert and resting comfortably. I reviewed the results of the emergency department evaluation with the patient.  I recommended primary care follow-up.  I advised the patient to return to the emergency department if their symptoms change or worsen.     [DR]      ED Course User Index  [DR] Boby Castro MD           Labs Reviewed   COMPREHENSIVE METABOLIC PANEL - Abnormal; Notable for the following components:       Result Value    Glucose 114 (*)     Potassium 3.4 (*)     All other components within normal limits    Narrative:     GFR Normal >60  Chronic Kidney Disease <60  Kidney Failure <15     CBC WITH AUTO DIFFERENTIAL - Abnormal; Notable for the following components:    WBC 11.02 (*)     Neutrophils, Absolute 7.23 (*)     All other components within normal limits   HCG, SERUM, QUALITATIVE - Normal   D-DIMER, QUANTITATIVE - Normal    Narrative:     Dimer values <500 ng/ml FEU are FDA approved as aid in diagnosis of deep venous thrombosis and pulmonary embolism.  This test should not be used in an exclusion strategy with pretest probability alone.    A recent guideline regarding diagnosis for pulmonary thromboembolism recommends an adjusted exclusion criterion of age x 10 ng/ml FEU for patients >50 years of age ([employer] Intern Med 2015; 163: 701-711).     TROPONIN  (IN-HOUSE) - Normal    Narrative:     Troponin T Reference Range:  <= 0.03 ng/mL-   Negative for AMI  >0.03 ng/mL-     Abnormal for myocardial necrosis.  Clinicians would have to utilize clinical acumen, EKG, Troponin and serial changes to determine if it is an Acute Myocardial Infarction or myocardial injury due to an underlying chronic condition.       Results may be falsely decreased if patient taking Biotin.     TROPONIN (IN-HOUSE) - Normal    Narrative:     Troponin T Reference Range:  <= 0.03 ng/mL-   Negative for AMI  >0.03 ng/mL-     Abnormal for myocardial necrosis.  Clinicians would have to utilize clinical acumen, EKG, Troponin and serial changes to determine if it is an Acute Myocardial Infarction or myocardial injury due to an underlying chronic condition.       Results may be falsely decreased if patient taking Biotin.     CBC AND DIFFERENTIAL    Narrative:     The following orders were created for panel order CBC & Differential.  Procedure                               Abnormality         Status                     ---------                               -----------         ------                     CBC Auto Differential[[id number]]        Abnormal            Final result                 Please view results for these tests on the individual orders.     XR Chest 1 View    Result Date: 8/6/2021  Narrative: PORTABLE CHEST X-RAY CLINICAL HISTORY: chest pain COMPARISON:  1/13/2017. FINDINGS:  Single portable view of the chest obtained.  There are various overlying monitor leads/artifacts in place. The lungs are well expanded and clear where they can be visualized.  Cardiac size is within normal limits.  Vascularity is normal considering technique.  No pleural fluid is demonstrated by portable imaging.     Impression: No active disease by portable imaging. Electronically signed by:  Sam Fong MD  8/6/2021 3:10 AM CDT Workstation: [id number]                                HEART Score (for prediction of  6-week risk of major adverse cardiac event) reviewed and/or performed as part of the patient evaluation and treatment planning process.  The result associated with this review/performance is: 0       MDM    Final diagnoses:   Atypical chest pain       ED Disposition  ED Disposition     ED Disposition Condition Comment    Discharge Stable           [facility] [address] [place] [address]  Schedule an appointment as soon as possible for a visit in 3 days  CALL  [phone]         Medication List      Stop    fluticasone 50 MCG/ACT nasal spray  Commonly known as: FLONASE     loratadine 10 MG tablet  Commonly known as: CLARITIN     pseudoephedrine 30 MG tablet  Commonly known as: Boby Adams MD  08/06/21 2977

## 2021-08-06 NOTE — ED TRIAGE NOTES
C/O intermittent midsternal chest pressure since Tuesday. No radiation. No SOA, dizziness, diaphoresis, N/V.

## 2021-08-13 ENCOUNTER — OFFICE VISIT (OUTPATIENT)
Dept: OBSTETRICS AND GYNECOLOGY | Facility: CLINIC | Age: 31
End: 2021-08-13

## 2021-08-13 VITALS
DIASTOLIC BLOOD PRESSURE: 80 MMHG | WEIGHT: 222 LBS | SYSTOLIC BLOOD PRESSURE: 116 MMHG | BODY MASS INDEX: 35.68 KG/M2 | HEIGHT: 66 IN

## 2021-08-13 DIAGNOSIS — Z30.430 ENCOUNTER FOR IUD INSERTION: Primary | ICD-10-CM

## 2021-08-13 PROCEDURE — 58300 INSERT INTRAUTERINE DEVICE: CPT | Performed by: NURSE PRACTITIONER

## 2021-08-13 NOTE — PROGRESS NOTES
IUD Insertion NDC 90169-325-33    Patient's last menstrual period was 08/12/2021. Negative UPT in the clinic today.     Date of procedure:  8/13/2021    Risks and benefits discussed? yes  All questions answered? yes  Consents given by The patient  Written consent obtained? yes    Local anesthesia used:  no    Procedure documentation:    After verifying the patient had a low probability of being pregnant and met the criteria for insertion, a speculum has placed and the cervix was cleansed with an antiseptic solution.  The anterior lip of the cervix was grasped and the uterine cavity was gently sounded. There was no difficulty passing the sound through the cervix.  Cervical dilation did not need to be performed prior to placing the IUD.  The uterus was vsounded to 6 cms.  The Mirena was then prepared per the manufacturers instructions.    The Mirena was advanced to a point 2 cms from the fundus and then the arms were released from the sheath.  The device was advanced to the fundus and the device was released fully from the sheath.. The string was cut 3 cms in length.  Bleeding from the cervix was scant.    She tolerated the procedure without any difficulty.    Post procedure instructions: Call if any fever or excessive bleeding or pain.    Follow up needed: 6 weeks for IUD string check and pap smear    This note was electronically signed.    Juli Ling, APRN  August 13, 2021

## 2021-08-30 LAB
QT INTERVAL: 354 MS
QTC INTERVAL: 415 MS

## 2021-09-24 ENCOUNTER — LAB (OUTPATIENT)
Dept: LAB | Facility: HOSPITAL | Age: 31
End: 2021-09-24

## 2021-09-24 ENCOUNTER — OFFICE VISIT (OUTPATIENT)
Dept: OBSTETRICS AND GYNECOLOGY | Facility: CLINIC | Age: 31
End: 2021-09-24

## 2021-09-24 VITALS
DIASTOLIC BLOOD PRESSURE: 84 MMHG | HEIGHT: 66 IN | SYSTOLIC BLOOD PRESSURE: 124 MMHG | BODY MASS INDEX: 35.77 KG/M2 | WEIGHT: 222.6 LBS

## 2021-09-24 DIAGNOSIS — L68.0 FEMALE HIRSUTISM: ICD-10-CM

## 2021-09-24 DIAGNOSIS — Z01.419 WOMEN'S ANNUAL ROUTINE GYNECOLOGICAL EXAMINATION: Primary | ICD-10-CM

## 2021-09-24 DIAGNOSIS — Z12.4 ENCOUNTER FOR PAPANICOLAOU SMEAR FOR CERVICAL CANCER SCREENING: ICD-10-CM

## 2021-09-24 LAB
HBA1C MFR BLD: 5.38 % (ref 4.8–5.6)
PROLACTIN SERPL-MCNC: 12.4 NG/ML (ref 4.79–23.3)
TSH SERPL DL<=0.05 MIU/L-ACNC: 1.69 UIU/ML (ref 0.27–4.2)

## 2021-09-24 PROCEDURE — 84402 ASSAY OF FREE TESTOSTERONE: CPT | Performed by: NURSE PRACTITIONER

## 2021-09-24 PROCEDURE — 36415 COLL VENOUS BLD VENIPUNCTURE: CPT | Performed by: NURSE PRACTITIONER

## 2021-09-24 PROCEDURE — 83498 ASY HYDROXYPROGESTERONE 17-D: CPT | Performed by: NURSE PRACTITIONER

## 2021-09-24 PROCEDURE — 84146 ASSAY OF PROLACTIN: CPT | Performed by: NURSE PRACTITIONER

## 2021-09-24 PROCEDURE — 84403 ASSAY OF TOTAL TESTOSTERONE: CPT | Performed by: NURSE PRACTITIONER

## 2021-09-24 PROCEDURE — 99395 PREV VISIT EST AGE 18-39: CPT | Performed by: NURSE PRACTITIONER

## 2021-09-24 PROCEDURE — 82627 DEHYDROEPIANDROSTERONE: CPT | Performed by: NURSE PRACTITIONER

## 2021-09-24 PROCEDURE — 84443 ASSAY THYROID STIM HORMONE: CPT | Performed by: NURSE PRACTITIONER

## 2021-09-24 PROCEDURE — 84270 ASSAY OF SEX HORMONE GLOBUL: CPT | Performed by: NURSE PRACTITIONER

## 2021-09-24 PROCEDURE — 83036 HEMOGLOBIN GLYCOSYLATED A1C: CPT | Performed by: NURSE PRACTITIONER

## 2021-09-24 RX ORDER — SPIRONOLACTONE 100 MG/1
100 TABLET, FILM COATED ORAL DAILY
Qty: 30 TABLET | Refills: 2 | Status: SHIPPED | OUTPATIENT
Start: 2021-09-24 | End: 2021-12-21 | Stop reason: SDUPTHER

## 2021-09-24 NOTE — PROGRESS NOTES
Subjective   Marlene Barbosa is a 31 y.o. here for IUD string check and gyn annual    Last Pfn7-, NIL  BC: [place] Rosario is doing well with the Mirena. Has no concerns but upon further inquiry of facial hair and possible diagnosis of PCOS, pt state she has irregular periods when on birth control. Denies ever having a work-up for PCOS.     Gynecologic Exam  The patient's pertinent negatives include no genital itching, genital lesions, genital odor, genital rash, missed menses, pelvic pain, vaginal bleeding or vaginal discharge. Pertinent negatives include no abdominal pain, chills, constipation, diarrhea, dysuria, fever, frequency, nausea, rash or sore throat. She is sexually active. It is unknown whether or not her partner has an STD. She uses an IUD for contraception. Her menstrual history has been irregular.       The following portions of the patient's history were reviewed and updated as appropriate: allergies, current medications, past family history, past medical history, past social history, past surgical history and problem list.    Review of Systems   Constitutional: Negative for chills, fatigue, fever, unexpected weight gain and unexpected weight loss.   HENT: Negative for sneezing and sore throat.    Respiratory: Negative for shortness of breath.    Cardiovascular: Negative for chest pain and palpitations.   Gastrointestinal: Negative for abdominal pain, constipation, diarrhea and nausea.   Endocrine: Negative for cold intolerance and heat intolerance.   Genitourinary: Negative for amenorrhea, breast discharge, breast lump, breast pain, difficulty urinating, dysuria, frequency, menstrual problem, missed menses, pelvic pain, pelvic pressure, urinary incontinence, vaginal bleeding, vaginal discharge and vaginal pain.   Skin: Negative for rash.        Facial hair   Neurological: Negative for weakness and headache.   Psychiatric/Behavioral: Negative for sleep disturbance, depressed mood and  stress.       Objective   Physical Exam  Vitals and nursing note reviewed. Exam conducted with a chaperone present.   Constitutional:       Appearance: She is well-developed.   HENT:      Head: Normocephalic and atraumatic.   Eyes:      Conjunctiva/sclera: Conjunctivae normal.   Neck:      Thyroid: No thyromegaly.   Cardiovascular:      Rate and Rhythm: Normal rate and regular rhythm.      Heart sounds: Normal heart sounds.   Pulmonary:      Effort: Pulmonary effort is normal. No respiratory distress.      Breath sounds: Normal breath sounds.   Abdominal:      General: Bowel sounds are normal. There is no distension.      Palpations: Abdomen is soft.      Tenderness: There is no abdominal tenderness.   Genitourinary:     General: Normal vulva.      Labia:         Right: No rash, tenderness, lesion or injury.         Left: No rash, tenderness, lesion or injury.       Vagina: Normal.      Cervix: Normal.      Uterus: Normal.       Adnexa: Right adnexa normal and left adnexa normal.      Rectum: Normal.      Comments: IUD string visualized. Pap smear collected.   Musculoskeletal:         General: Normal range of motion.      Cervical back: Normal range of motion and neck supple.   Skin:     General: Skin is warm and dry.      Comments: Facial hair.    Neurological:      Mental Status: She is alert and oriented to person, place, and time.   Psychiatric:         Behavior: Behavior normal.           Assessment/Plan   Diagnoses and all orders for this visit:    1. Women's annual routine gynecological examination (Primary)  -     Liquid-based Pap Smear, Screening    2. Female hirsutism  -     17-Hydroxyprogesterone  -     DHEA-Sulfate  -     Prolactin  -     Sex Horm Binding Globulin  -     Testosterone, Free, Total  -     TSH  -     Hemoglobin A1c    3. Encounter for Papanicolaou smear for cervical cancer screening    Other orders  -     spironolactone (Aldactone) 100 MG tablet; Take 1 tablet by mouth Daily.  Dispense: 30  tablet; Refill: 2        Reviewed pap smear screening guidelines and breast awareness. Discussed that pt likely has PCOS based on hx of irregular periods and facial hair; will do labwork today and call pt with results. R/B/A of Aldactone for tx of facial hair; f/u in 3 months.

## 2021-09-25 LAB
DHEA-S SERPL-MCNC: 224 UG/DL (ref 84.8–378)
SHBG SERPL-SCNC: 13.3 NMOL/L (ref 24.6–122)

## 2021-09-29 LAB
LAB AP CASE REPORT: NORMAL
PATH INTERP SPEC-IMP: NORMAL

## 2021-09-30 LAB
17OHP SERPL-MCNC: 83 NG/DL
TESTOST FREE SERPL-MCNC: 3.7 PG/ML (ref 0–4.2)
TESTOST SERPL-MCNC: 61 NG/DL (ref 13–71)

## 2021-12-21 ENCOUNTER — OFFICE VISIT (OUTPATIENT)
Dept: OBSTETRICS AND GYNECOLOGY | Facility: CLINIC | Age: 31
End: 2021-12-21

## 2021-12-21 VITALS
DIASTOLIC BLOOD PRESSURE: 62 MMHG | WEIGHT: 221 LBS | HEIGHT: 66 IN | BODY MASS INDEX: 35.52 KG/M2 | SYSTOLIC BLOOD PRESSURE: 110 MMHG

## 2021-12-21 DIAGNOSIS — E28.2 PCOS (POLYCYSTIC OVARIAN SYNDROME): Primary | ICD-10-CM

## 2021-12-21 DIAGNOSIS — L67.8 ABNORMAL FACIAL HAIR: ICD-10-CM

## 2021-12-21 PROCEDURE — 99212 OFFICE O/P EST SF 10 MIN: CPT | Performed by: NURSE PRACTITIONER

## 2021-12-21 RX ORDER — SPIRONOLACTONE 100 MG/1
100 TABLET, FILM COATED ORAL DAILY
Qty: 30 TABLET | Refills: 11 | Status: SHIPPED | OUTPATIENT
Start: 2021-12-21 | End: 2022-12-28

## 2021-12-21 NOTE — PROGRESS NOTES
Subjective   Marlene Barbosa is a 31 y.o. here for 3 month f/u on aldactone    Marlene Barbosa is a 31 yr old  who presents today for follow-up on aldactone. She feels the medicine has helped with her facial hair, would like refills. Is happy with her Mirena.       The following portions of the patient's history were reviewed and updated as appropriate: past family history, past medical history, past social history, past surgical history and problem list.    Review of Systems   Constitutional: Negative for chills, fatigue and fever.   Respiratory: Negative for shortness of breath.    Cardiovascular: Negative for chest pain and palpitations.   Gastrointestinal: Negative for abdominal pain, constipation, diarrhea and nausea.   Genitourinary: Negative for dysuria, frequency, menstrual problem, pelvic pressure, vaginal bleeding, vaginal discharge and vaginal pain.   Skin: Negative for rash.        Facial hair, particularly on the chin.    Neurological: Negative for headache.   Psychiatric/Behavioral: Negative for depressed mood.       Objective   Physical Exam  Constitutional:       Appearance: She is obese.   Skin:     Comments: Facial hair appears improved.    Neurological:      Mental Status: She is alert.           Assessment/Plan   Diagnoses and all orders for this visit:    1. PCOS (polycystic ovarian syndrome) (Primary)    2. Abnormal facial hair    Other orders  -     spironolactone (Aldactone) 100 MG tablet; Take 1 tablet by mouth Daily.  Dispense: 30 tablet; Refill: 11          Return in 1 year for refills and gyn annual.

## 2022-12-28 ENCOUNTER — TELEPHONE (OUTPATIENT)
Dept: OBSTETRICS AND GYNECOLOGY | Facility: CLINIC | Age: 32
End: 2022-12-28

## 2022-12-28 RX ORDER — SPIRONOLACTONE 100 MG/1
100 TABLET, FILM COATED ORAL DAILY
Qty: 30 TABLET | Refills: 6 | Status: SHIPPED | OUTPATIENT
Start: 2022-12-28 | End: 2023-01-11 | Stop reason: SDUPTHER

## 2022-12-28 RX ORDER — SPIRONOLACTONE 100 MG/1
TABLET, FILM COATED ORAL
Qty: 30 TABLET | Refills: 0 | Status: SHIPPED | OUTPATIENT
Start: 2022-12-28 | End: 2022-12-28 | Stop reason: SDUPTHER

## 2022-12-28 NOTE — TELEPHONE ENCOUNTER
PATIENT CALLED AND IS NEEDING A REFILL ON HERspironolactone (Aldactone) 100 MG tablet    AND WOULD LIKE FOR IT TO BE CALLED INTO [facility] IN [place]. HER NUMBER [phone].      THANKS,        ISMAEL

## 2023-01-11 ENCOUNTER — OFFICE VISIT (OUTPATIENT)
Dept: OBSTETRICS AND GYNECOLOGY | Facility: CLINIC | Age: 33
End: 2023-01-11
Payer: MEDICAID

## 2023-01-11 VITALS
HEIGHT: 66 IN | DIASTOLIC BLOOD PRESSURE: 76 MMHG | SYSTOLIC BLOOD PRESSURE: 112 MMHG | WEIGHT: 209 LBS | BODY MASS INDEX: 33.59 KG/M2

## 2023-01-11 DIAGNOSIS — L68.0 FEMALE HIRSUTISM: Primary | ICD-10-CM

## 2023-01-11 DIAGNOSIS — E28.2 PCOS (POLYCYSTIC OVARIAN SYNDROME): ICD-10-CM

## 2023-01-11 PROCEDURE — 99213 OFFICE O/P EST LOW 20 MIN: CPT | Performed by: NURSE PRACTITIONER

## 2023-01-11 RX ORDER — SPIRONOLACTONE 100 MG/1
100 TABLET, FILM COATED ORAL DAILY
Qty: 30 TABLET | Refills: 11 | Status: SHIPPED | OUTPATIENT
Start: 2023-01-11 | End: 2023-01-11 | Stop reason: SDUPTHER

## 2023-01-11 RX ORDER — SPIRONOLACTONE 100 MG/1
100 TABLET, FILM COATED ORAL 2 TIMES DAILY
Qty: 60 TABLET | Refills: 11 | Status: SHIPPED | OUTPATIENT
Start: 2023-01-11

## 2023-01-11 NOTE — PROGRESS NOTES
Subjective   Marlene Barbosa is a 32 y.o. her for refill aldactone    History of Present Illness  Marlene Barbosa is a 32 yr old  premenopausal female with history of PCOS. On 100mg daily on aldactone and is wondering if she can increased her dose. Seeing improvement in facial hair but roots are still visible. No gyn complaints. Doing fine on Mirena.       The following portions of the patient's history were reviewed and updated as appropriate: allergies, current medications, past family history, past medical history, past social history, past surgical history and problem list.    Review of Systems   Constitutional: Negative for chills, fatigue and fever.   Respiratory: Negative for shortness of breath.    Cardiovascular: Negative for chest pain and palpitations.   Gastrointestinal: Negative for abdominal pain, constipation, diarrhea and nausea.   Genitourinary: Negative for dysuria, frequency, menstrual problem, pelvic pressure, vaginal bleeding, vaginal discharge and vaginal pain.   Skin:        Facial hair   Neurological: Negative for headache.   Psychiatric/Behavioral: Negative for depressed mood.       Objective   Physical Exam  Vitals and nursing note reviewed.   Constitutional:       Appearance: Normal appearance.   Pulmonary:      Effort: Pulmonary effort is normal.   Skin:     Comments: Facial hair on chin   Neurological:      Mental Status: She is alert.   Psychiatric:         Mood and Affect: Mood normal.         Behavior: Behavior normal.           Assessment & Plan   Diagnoses and all orders for this visit:    1. Female hirsutism (Primary)    2. PCOS (polycystic ovarian syndrome)    Other orders  -     Discontinue: spironolactone (ALDACTONE) 100 MG tablet; Take 1 tablet by mouth Daily.  Dispense: 30 tablet; Refill: 11  -     spironolactone (ALDACTONE) 100 MG tablet; Take 1 tablet by mouth 2 (Two) Times a Day.  Dispense: 60 tablet; Refill: 11      Aldactone increased to 100mg BID. Return as  needed.

## 2023-06-02 ENCOUNTER — HOSPITAL ENCOUNTER (EMERGENCY)
Facility: HOSPITAL | Age: 33
Discharge: HOME | End: 2023-06-02
Attending: EMERGENCY MEDICINE
Payer: MEDICAID

## 2023-06-02 ENCOUNTER — APPOINTMENT (OUTPATIENT)
Dept: ULTRASOUND IMAGING | Facility: HOSPITAL | Age: 33
End: 2023-06-02
Payer: MEDICAID

## 2023-06-02 ENCOUNTER — APPOINTMENT (OUTPATIENT)
Dept: CT IMAGING | Facility: HOSPITAL | Age: 33
End: 2023-06-02
Payer: MEDICAID

## 2023-06-02 VITALS
DIASTOLIC BLOOD PRESSURE: 80 MMHG | HEIGHT: 66 IN | TEMPERATURE: 98.2 F | HEART RATE: 87 BPM | SYSTOLIC BLOOD PRESSURE: 120 MMHG | BODY MASS INDEX: 32.4 KG/M2 | OXYGEN SATURATION: 97 % | RESPIRATION RATE: 18 BRPM | WEIGHT: 201.6 LBS

## 2023-06-02 DIAGNOSIS — R31.0 GROSS HEMATURIA: Primary | ICD-10-CM

## 2023-06-02 LAB
ALBUMIN SERPL-MCNC: 3.9 G/DL (ref 3.5–5.2)
ALBUMIN/GLOB SERPL: 1.3 G/DL
ALP SERPL-CCNC: 59 U/L (ref 39–117)
ALT SERPL W P-5'-P-CCNC: 15 U/L (ref 1–33)
ANION GAP SERPL CALCULATED.3IONS-SCNC: 12 MMOL/L (ref 5–15)
AST SERPL-CCNC: 14 U/L (ref 1–32)
BACTERIA UR QL AUTO: ABNORMAL /HPF
BASOPHILS # BLD AUTO: 0.07 10*3/MM3 (ref 0–0.2)
BASOPHILS NFR BLD AUTO: 0.7 % (ref 0–1.5)
BILIRUB SERPL-MCNC: 0.3 MG/DL (ref 0–1.2)
BILIRUB UR QL STRIP: NEGATIVE
BUN SERPL-MCNC: 8 MG/DL (ref 6–20)
BUN/CREAT SERPL: 11.4 (ref 7–25)
CALCIUM SPEC-SCNC: 9.1 MG/DL (ref 8.6–10.5)
CHLORIDE SERPL-SCNC: 106 MMOL/L (ref 98–107)
CLARITY UR: CLEAR
CO2 SERPL-SCNC: 21 MMOL/L (ref 22–29)
COLOR UR: YELLOW
CREAT SERPL-MCNC: 0.7 MG/DL (ref 0.57–1)
DEPRECATED RDW RBC AUTO: 36.5 FL (ref 37–54)
EGFRCR SERPLBLD CKD-EPI 2021: 118.7 ML/MIN/1.73
EOSINOPHIL # BLD AUTO: 0.31 10*3/MM3 (ref 0–0.4)
EOSINOPHIL NFR BLD AUTO: 3 % (ref 0.3–6.2)
ERYTHROCYTE [DISTWIDTH] IN BLOOD BY AUTOMATED COUNT: 12.2 % (ref 12.3–15.4)
GLOBULIN UR ELPH-MCNC: 2.9 GM/DL
GLUCOSE SERPL-MCNC: 126 MG/DL (ref 65–99)
GLUCOSE UR STRIP-MCNC: NEGATIVE MG/DL
HCG SERPL QL: NEGATIVE
HCT VFR BLD AUTO: 39 % (ref 34–46.6)
HGB BLD-MCNC: 13.9 G/DL (ref 12–15.9)
HGB UR QL STRIP.AUTO: ABNORMAL
HYALINE CASTS UR QL AUTO: ABNORMAL /LPF
IMM GRANULOCYTES # BLD AUTO: 0.02 10*3/MM3 (ref 0–0.05)
IMM GRANULOCYTES NFR BLD AUTO: 0.2 % (ref 0–0.5)
KETONES UR QL STRIP: ABNORMAL
LEUKOCYTE ESTERASE UR QL STRIP.AUTO: ABNORMAL
LYMPHOCYTES # BLD AUTO: 2.1 10*3/MM3 (ref 0.7–3.1)
LYMPHOCYTES NFR BLD AUTO: 20.6 % (ref 19.6–45.3)
MCH RBC QN AUTO: 29.3 PG (ref 26.6–33)
MCHC RBC AUTO-ENTMCNC: 35.6 G/DL (ref 31.5–35.7)
MCV RBC AUTO: 82.3 FL (ref 79–97)
MONOCYTES # BLD AUTO: 0.78 10*3/MM3 (ref 0.1–0.9)
MONOCYTES NFR BLD AUTO: 7.7 % (ref 5–12)
NEUTROPHILS NFR BLD AUTO: 6.9 10*3/MM3 (ref 1.7–7)
NEUTROPHILS NFR BLD AUTO: 67.8 % (ref 42.7–76)
NITRITE UR QL STRIP: NEGATIVE
NRBC BLD AUTO-RTO: 0 /100 WBC (ref 0–0.2)
PH UR STRIP.AUTO: 6 [PH] (ref 5–9)
PLATELET # BLD AUTO: 258 10*3/MM3 (ref 140–450)
PMV BLD AUTO: 11.7 FL (ref 6–12)
POTASSIUM SERPL-SCNC: 3.6 MMOL/L (ref 3.5–5.2)
PROT SERPL-MCNC: 6.8 G/DL (ref 6–8.5)
PROT UR QL STRIP: NEGATIVE
RBC # BLD AUTO: 4.74 10*6/MM3 (ref 3.77–5.28)
RBC # UR STRIP: ABNORMAL /HPF
REF LAB TEST METHOD: ABNORMAL
SODIUM SERPL-SCNC: 139 MMOL/L (ref 136–145)
SP GR UR STRIP: 1.02 (ref 1–1.03)
SQUAMOUS #/AREA URNS HPF: ABNORMAL /HPF
UROBILINOGEN UR QL STRIP: ABNORMAL
WBC # UR STRIP: ABNORMAL /HPF
WBC NRBC COR # BLD: 10.18 10*3/MM3 (ref 3.4–10.8)
WHOLE BLOOD HOLD COAG: NORMAL

## 2023-06-02 PROCEDURE — 76830 TRANSVAGINAL US NON-OB: CPT

## 2023-06-02 PROCEDURE — 96374 THER/PROPH/DIAG INJ IV PUSH: CPT

## 2023-06-02 PROCEDURE — 85025 COMPLETE CBC W/AUTO DIFF WBC: CPT | Performed by: NURSE PRACTITIONER

## 2023-06-02 PROCEDURE — 99283 EMERGENCY DEPT VISIT LOW MDM: CPT

## 2023-06-02 PROCEDURE — 74176 CT ABD & PELVIS W/O CONTRAST: CPT

## 2023-06-02 PROCEDURE — 84703 CHORIONIC GONADOTROPIN ASSAY: CPT | Performed by: NURSE PRACTITIONER

## 2023-06-02 PROCEDURE — 80053 COMPREHEN METABOLIC PANEL: CPT | Performed by: NURSE PRACTITIONER

## 2023-06-02 PROCEDURE — 25010000002 KETOROLAC TROMETHAMINE PER 15 MG: Performed by: NURSE PRACTITIONER

## 2023-06-02 PROCEDURE — 81001 URINALYSIS AUTO W/SCOPE: CPT | Performed by: EMERGENCY MEDICINE

## 2023-06-02 RX ORDER — KETOROLAC TROMETHAMINE 30 MG/ML
30 INJECTION, SOLUTION INTRAMUSCULAR; INTRAVENOUS ONCE
Status: COMPLETED | OUTPATIENT
Start: 2023-06-02 | End: 2023-06-02

## 2023-06-02 RX ORDER — SODIUM CHLORIDE 0.9 % (FLUSH) 0.9 %
10 SYRINGE (ML) INJECTION AS NEEDED
Status: DISCONTINUED | OUTPATIENT
Start: 2023-06-02 | End: 2023-06-02 | Stop reason: HOSPADM

## 2023-06-02 RX ADMIN — KETOROLAC TROMETHAMINE 30 MG: 30 INJECTION, SOLUTION INTRAMUSCULAR; INTRAVENOUS at 20:01

## 2023-06-02 RX ADMIN — SODIUM CHLORIDE 1000 ML: 9 INJECTION, SOLUTION INTRAVENOUS at 20:01

## 2023-06-03 NOTE — DISCHARGE INSTRUCTIONS
Your work up show you have blood in your urine but your test did not show any acute finding. Follow up with the [facility] or a primary care of your choice for further evaluation of the blood in your urine. Call Monday for appointment. Also follow up with your GYN for reevaluation.. May use over the counter Ibuprofen or Aleve for cramping and pain. Return to the ER if your symptoms worsen or change in presentation over the weekend.

## 2023-06-03 NOTE — ED PROVIDER NOTES
Subjective   History of Present Illness  Patient presents to the ER with c/o blood in urine since Sunday. She states she has noticed it every time she wipes and is light pink tinge. Today she noticed very small clots in her urine. She states she has an IUD along with PCOS and has not had a period in almost a year. She does report she has been wearing a pad that she has noticed very small amount of blood to (pads not saturated). Reports abdominal cramping to suprapubic region.         Review of Systems   Constitutional: Negative for chills and fever.   Respiratory: Negative for shortness of breath.    Cardiovascular: Negative for chest pain.   Gastrointestinal: Positive for abdominal pain (suprapubic). Negative for nausea and vomiting.   Genitourinary: Positive for frequency, hematuria and urgency. Negative for difficulty urinating, dysuria, flank pain and vaginal discharge.   Musculoskeletal: Negative for back pain.   Skin: Negative.    Neurological: Negative.    Psychiatric/Behavioral: Negative.        Past Medical History:   Diagnosis Date   • Abrasion     Abrasion, forearm area      • Acute pharyngitis    • Acute sinusitis    • Allergic rhinitis due to pollen    • Cervicovaginal cytology normal or benign      Low grade squamous intraepithelial lesion      • Common cold    • Eruption cyst     Maculopapular eruption      • Family planning, lunelle surveillance    • Genital herpes simplex    • Headache    • Herpes    • Infestation by Sarcoptes scabiei tarik hominis    • Irregular periods    • Myalgia    • Nausea and vomiting    • Normal gynecologic examination    • Oral contraception initial prescription    • Upper respiratory infection    • Vaginal discharge        No Known Allergies    Past Surgical History:   Procedure Laterality Date   • DENTAL PROCEDURE     • INJECTION OF MEDICATION  10/13/2014    Kenalog(1)   • INJECTION OF MEDICATION  04/04/2015    Zofran(1)       Family History   Problem Relation Age of Onset  "  • Breast cancer Other    • Breast cancer Father    • Breast cancer Maternal Grandmother    • Colon cancer Neg Hx    • Endometrial cancer Neg Hx    • Ovarian cancer Neg Hx        Social History     Socioeconomic History   • Marital status: Single   Tobacco Use   • Smoking status: Never   • Smokeless tobacco: Never   • Tobacco comments:     stopped 1 year ago   Substance and Sexual Activity   • Alcohol use: No   • Drug use: No   • Sexual activity: Not Currently     Partners: Male     Birth control/protection: Condom, I.U.D.     Comment: last pap smear 7/2014 negative            Objective    /80 (BP Location: Right arm, Patient Position: Sitting)   Pulse 87   Temp 98.2 °F (36.8 °C) (Oral)   Resp 18   Ht 167.6 cm (66\")   Wt 91.4 kg (201 lb 9.6 oz)   LMP  (LMP Unknown)   SpO2 97%   BMI 32.54 kg/m²     Physical Exam  Vitals and nursing note reviewed.   Constitutional:       General: She is not in acute distress.     Appearance: She is well-developed. She is not ill-appearing.   HENT:      Head: Normocephalic and atraumatic.   Cardiovascular:      Rate and Rhythm: Normal rate and regular rhythm.      Heart sounds: Normal heart sounds. No murmur heard.  Pulmonary:      Effort: Pulmonary effort is normal. No respiratory distress.      Breath sounds: Normal breath sounds. No wheezing.   Abdominal:      General: Bowel sounds are normal. There is no distension.      Palpations: Abdomen is soft.      Tenderness: There is abdominal tenderness (suprapubic). There is no right CVA tenderness, left CVA tenderness, guarding or rebound.   Musculoskeletal:         General: Normal range of motion.      Cervical back: Normal range of motion and neck supple.   Skin:     General: Skin is warm and dry.      Capillary Refill: Capillary refill takes less than 2 seconds.   Neurological:      Mental Status: She is alert and oriented to person, place, and time.      Coordination: Coordination normal.   Psychiatric:         " Behavior: Behavior normal.         Thought Content: Thought content normal.         Judgment: Judgment normal.         Procedures  Results for orders placed or performed during the hospital encounter of 06/02/23   Urinalysis With Microscopic If Indicated (No Culture) - Urine, Clean Catch    Specimen: Urine, Clean Catch   Result Value Ref Range    Color, UA Yellow Yellow, Straw, Dark Yellow, Josefina    Appearance, UA Clear Clear    pH, UA 6.0 5.0 - 9.0    Specific Gravity, UA 1.023 1.003 - 1.030    Glucose, UA Negative Negative    Ketones, UA Trace (A) Negative    Bilirubin, UA Negative Negative    Blood, UA Large (3+) (A) Negative    Protein, UA Negative Negative    Leuk Esterase, UA Trace (A) Negative    Nitrite, UA Negative Negative    Urobilinogen, UA 4.0 E.U./dL (A) 0.2 - 1.0 E.U./dL   Urinalysis, Microscopic Only - Urine, Clean Catch    Specimen: Urine, Clean Catch   Result Value Ref Range    RBC, UA 21-30 (A) None Seen /HPF    WBC, UA 0-2 None Seen, 0-2, 3-5 /HPF    Bacteria, UA None Seen None Seen /HPF    Squamous Epithelial Cells, UA 3-5 (A) None Seen, 0-2 /HPF    Hyaline Casts, UA None Seen None Seen /LPF    Methodology Automated Microscopy    Comprehensive Metabolic Panel    Specimen: Blood   Result Value Ref Range    Glucose 126 (H) 65 - 99 mg/dL    BUN 8 6 - 20 mg/dL    Creatinine 0.70 0.57 - 1.00 mg/dL    Sodium 139 136 - 145 mmol/L    Potassium 3.6 3.5 - 5.2 mmol/L    Chloride 106 98 - 107 mmol/L    CO2 21.0 (L) 22.0 - 29.0 mmol/L    Calcium 9.1 8.6 - 10.5 mg/dL    Total Protein 6.8 6.0 - 8.5 g/dL    Albumin 3.9 3.5 - 5.2 g/dL    ALT (SGPT) 15 1 - 33 U/L    AST (SGOT) 14 1 - 32 U/L    Alkaline Phosphatase 59 39 - 117 U/L    Total Bilirubin 0.3 0.0 - 1.2 mg/dL    Globulin 2.9 gm/dL    A/G Ratio 1.3 g/dL    BUN/Creatinine Ratio 11.4 7.0 - 25.0    Anion Gap 12.0 5.0 - 15.0 mmol/L    eGFR 118.7 >60.0 mL/min/1.73   hCG, Serum, Qualitative    Specimen: Blood   Result Value Ref Range    HCG Qualitative Negative  Negative   CBC Auto Differential    Specimen: Blood   Result Value Ref Range    WBC 10.18 3.40 - 10.80 10*3/mm3    RBC 4.74 3.77 - 5.28 10*6/mm3    Hemoglobin 13.9 12.0 - 15.9 g/dL    Hematocrit 39.0 34.0 - 46.6 %    MCV 82.3 79.0 - 97.0 fL    MCH 29.3 26.6 - 33.0 pg    MCHC 35.6 31.5 - 35.7 g/dL    RDW 12.2 (L) 12.3 - 15.4 %    RDW-SD 36.5 (L) 37.0 - 54.0 fl    MPV 11.7 6.0 - 12.0 fL    Platelets 258 140 - 450 10*3/mm3    Neutrophil % 67.8 42.7 - 76.0 %    Lymphocyte % 20.6 19.6 - 45.3 %    Monocyte % 7.7 5.0 - 12.0 %    Eosinophil % 3.0 0.3 - 6.2 %    Basophil % 0.7 0.0 - 1.5 %    Immature Grans % 0.2 0.0 - 0.5 %    Neutrophils, Absolute 6.90 1.70 - 7.00 10*3/mm3    Lymphocytes, Absolute 2.10 0.70 - 3.10 10*3/mm3    Monocytes, Absolute 0.78 0.10 - 0.90 10*3/mm3    Eosinophils, Absolute 0.31 0.00 - 0.40 10*3/mm3    Basophils, Absolute 0.07 0.00 - 0.20 10*3/mm3    Immature Grans, Absolute 0.02 0.00 - 0.05 10*3/mm3    nRBC 0.0 0.0 - 0.2 /100 WBC   Light Blue Top   Result Value Ref Range    Extra Tube Hold for add-ons.      CT Abdomen Pelvis Without Contrast    Result Date: 6/2/2023  Narrative: INDICATION: Hematuria. TECHNIQUE: Axial images from the level of the diaphragms through the pelvis were performed followed by 2D multiplanar reformats. There is no evidence of renal or ureteral calculus.  No hydronephrosis. Unenhanced solid organs are negative.  There is an IUD in the uterus. SUMMARY: No acute findings.    US Non-ob Transvaginal    Result Date: 6/2/2023  Narrative: INDICATION: Vaginal bleeding. TECHNIQUE: High-resolution endovaginal gray scale images were performed of the uterus, ovaries, endometrium and adnexa.  Color Doppler of the ovaries was performed. FINDINGS: The uterus is normal.  Endometrial thickness is 5 mm.  There is an IUD in place. Multiple follicles of the right ovary.  The left ovary demonstrates a dominant cyst at 4.2 x 3.5 cm.  No free fluid. SUMMARY: Cyst on the left.  Exam otherwise  negative.             ED Course                                           Medical Decision Making  Patient presents to the ER with c/o hematuria since Sunday and suprapubic pain. Work up does not reveal any acute causes for the hematuria. Discussed follow up with PCP and with GYN for further evaluation. Patient verbalized understanding.     Gross hematuria: complicated acute illness or injury  Amount and/or Complexity of Data Reviewed  Labs: ordered.  Radiology: ordered.      Risk  Prescription drug management.          Final diagnoses:   Gross hematuria       ED Disposition  ED Disposition     ED Disposition   Discharge    Condition   Stable    Comment   --             Juli Oglesby, APRN  [address]  [address]  [facility] [address] [phone]    Schedule an appointment as soon as possible for a visit   ER follow up    [facility]  [address]   [facility] [address] [phone]  Schedule an appointment as soon as possible for a visit   ER follow up         Medication List      Stop    methylPREDNISolone 4 MG dose pack  Commonly known as: Natalia Montes, APRN  06/02/23 [id number]

## 2023-06-03 NOTE — ED NOTES
"Pt reports symptoms started \"either Sunday or Monday, I have not been bleeding for years because I have PCOS and also an IUD\"; pt states \"I think my IUD may have moved but I am not sure\"; pt states \"I noticed when I went to the bathroom there were clots in my urine and also when I wiped\". Pt denies sob, cp, fever, chills, n/v/d at this time.   "

## 2023-08-02 ENCOUNTER — PROCEDURE VISIT (OUTPATIENT)
Dept: OBSTETRICS AND GYNECOLOGY | Facility: CLINIC | Age: 33
End: 2023-08-02
Payer: MEDICAID

## 2023-08-02 VITALS
DIASTOLIC BLOOD PRESSURE: 68 MMHG | HEIGHT: 66 IN | WEIGHT: 203 LBS | BODY MASS INDEX: 32.62 KG/M2 | SYSTOLIC BLOOD PRESSURE: 112 MMHG

## 2023-08-02 DIAGNOSIS — Z30.432 ENCOUNTER FOR IUD REMOVAL: Primary | ICD-10-CM

## 2023-08-02 RX ORDER — MEDROXYPROGESTERONE ACETATE 10 MG/1
10 TABLET ORAL DAILY
Qty: 10 TABLET | Refills: 4 | Status: SHIPPED | OUTPATIENT
Start: 2023-08-02

## 2023-10-25 ENCOUNTER — TELEPHONE (OUTPATIENT)
Dept: CARDIOLOGY | Facility: CLINIC | Age: 33
End: 2023-10-25

## 2023-11-20 ENCOUNTER — OFFICE VISIT (OUTPATIENT)
Dept: CARDIOLOGY | Facility: CLINIC | Age: 33
End: 2023-11-20
Payer: COMMERCIAL

## 2023-11-20 VITALS
DIASTOLIC BLOOD PRESSURE: 62 MMHG | SYSTOLIC BLOOD PRESSURE: 120 MMHG | HEART RATE: 82 BPM | BODY MASS INDEX: 25.25 KG/M2 | OXYGEN SATURATION: 99 % | HEIGHT: 62 IN | WEIGHT: 137.2 LBS

## 2023-11-20 DIAGNOSIS — R07.89 NON-CARDIAC CHEST PAIN: ICD-10-CM

## 2023-11-20 DIAGNOSIS — Z82.49 FAMILY HISTORY OF PREMATURE CAD: ICD-10-CM

## 2023-11-20 DIAGNOSIS — Z79.899 CURRENT USE OF PROTON PUMP INHIBITOR: ICD-10-CM

## 2023-11-20 DIAGNOSIS — R00.2 PALPITATIONS: Primary | ICD-10-CM

## 2023-11-20 PROCEDURE — 99204 OFFICE O/P NEW MOD 45 MIN: CPT | Performed by: NURSE PRACTITIONER

## 2023-11-20 PROCEDURE — 93000 ELECTROCARDIOGRAM COMPLETE: CPT | Performed by: NURSE PRACTITIONER

## 2023-11-20 RX ORDER — LORATADINE 10 MG/1
10 TABLET ORAL DAILY PRN
COMMUNITY

## 2023-11-20 RX ORDER — OMEPRAZOLE 20 MG/1
20 CAPSULE, DELAYED RELEASE ORAL DAILY PRN
COMMUNITY

## 2023-11-20 RX ORDER — IBUPROFEN 200 MG
200 TABLET ORAL EVERY 6 HOURS PRN
COMMUNITY

## 2023-11-20 NOTE — PROGRESS NOTES
Encounter Date:11/20/2023  Chief Complaint:   Subjective    Vani Akers is a 33 y.o. female who presents to [facility] today for cardiac evaluation at the patient's request due to palpitations.      Palpitations   Associated symptoms include chest pain.   Chest Pain   Associated symptoms include palpitations.   Heartburn  She complains of chest pain.      HPI       new patient     Additional comments: Self referred/ has not seen her PCP lately or had labs             Irregular Heart Beat     Additional comments: Beats funny for two beats and then goes back to normal for the last month. Feels like stops - takes her breath away, coughing helps for several months. Sporadic - not daily. Only occurs once every 2 weeks or less. No recent labs. Has never tested positive for COVID or been vaccinated. Has chronic seasonal allergies. No excessive caffeine intake - usually 1-3 per day - not related. No energy drinks. Takes unknown OTC decongestant PRN - phenylephrine.             Chest Pain     Additional comments: Lasting just a few seconds- left shoulder down toward center of chest - tearing/burning sensation. Started a few weeks ago. 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 Not with exertion. Not related to palpitations.             Heartburn     Additional comments: Sometimes even after drinking a glass of water - makes her feel like throat/esophagus is on fire for the last several months. Omeprazole OTC has helped. Started about 2 months ago. Has cut back on ibuprofen - was taking for migraines which have improved that started after having her first child. Migraines improved after daith piercing. Had IUD 2021 - removed 2022.Seeing Dr. Bautista - thyroid ok.            Last edited by Sade Hurd, POORNIMA on 11/20/2023 11:41 AM.        Past Medical History:   Diagnosis Date    Abrasion of forearm     Acute pharyngitis     Acute sinusitis     Allergic  rhinitis due to pollen     Cervicovaginal cytology normal or benign      Low grade squamous intraepithelial lesion       Common cold     Family planning, lunelle surveillance     Genital herpes simplex     GERD (gastroesophageal reflux disease)     Gestational diabetes     Headache     Infestation by Sarcoptes scabiei devora hominis     Irregular periods     Maculopapular rash     Maculopapular eruption       Migraine     NOT MEDICATED    Myalgia     Nausea and vomiting     Upper respiratory infection     Vaginal discharge     Visit for gynecologic examination      Past Surgical History:   Procedure Laterality Date     SECTION       SECTION WITH TUBAL Bilateral 10/12/2016    Procedure:  SECTION REPEAT WITH TUBAL;  Surgeon: Valery Martinez MD;  Location: [facility] LABOR DELIVERY;  Service:     INJECTION OF MEDICATION  10/13/2014    Kenalog(1)    INJECTION OF MEDICATION  2015    Zofran(1)     Family History   Problem Relation Age of Onset    No Known Problems Mother     Hypertension Father     Heart disease Father 54        CABG x 3, heavy smoker    No Known Problems Sister     Heart disease Paternal Uncle         stents    Heart attack Maternal Grandfather         cause of death at unknown age    Heart disease Maternal Grandfather     Heart disease Paternal Grandmother     Diabetes Paternal Grandmother     Cirrhosis Paternal Grandmother         non-alcoholic    Heart disease Paternal Grandfather         CABG and redo    Breast cancer Other     Colon cancer Neg Hx     Endometrial cancer Neg Hx     Ovarian cancer Neg Hx      Social History     Socioeconomic History    Marital status:    Tobacco Use    Smoking status: Never     Passive exposure: Past (from father in home and vehicle for years,  smokes but not in home or vehicle)    Tobacco comments:     As a teenager - smoked less than 1 pack of cigarettes   Vaping Use    Vaping Use: Never used   Substance and Sexual  "Activity    Alcohol use: Not Currently     Comment: occasionally in her 20s - never heavy    Drug use: Never    Sexual activity: Yes     Partners: Male     Birth control/protection: Tubal ligation     History: Past medical, surgical, family, and social history reviewed.    Outpatient Medications Marked as Taking for the 11/20/23 encounter (Office Visit) with Sade Hurd APRN   Medication Sig Dispense Refill    Acetaminophen (Tylenol) 325 MG capsule Take 650 mg by mouth Daily As Needed (headache).      Chlorphen-Phenyleph-Methscop (PHENYLEPHRINE CM PO) Take  by mouth As Needed.      ibuprofen (ADVIL,MOTRIN) 200 MG tablet Take 1 tablet by mouth Every 6 (Six) Hours As Needed for Mild Pain.      loratadine (CLARITIN) 10 MG tablet Take 1 tablet by mouth Daily As Needed for Allergies.      omeprazole (priLOSEC) 20 MG capsule Take 1 capsule by mouth Daily As Needed.          Objective     Vital Signs:   /62 (BP Location: Left arm, Patient Position: Sitting, Cuff Size: Adult)   Pulse 82   Ht 157.5 cm (62\")   Wt 62.2 kg (137 lb 3.2 oz)   SpO2 99%   BMI 25.09 kg/m²   Wt Readings from Last 3 Encounters:   11/20/23 62.2 kg (137 lb 3.2 oz)   10/12/16 65 kg (143 lb 4 oz)   10/06/16 64.4 kg (142 lb)         Vitals reviewed.   Constitutional:       Appearance: Well-developed.   Eyes:      General: No scleral icterus.  Neck:      Vascular: No JVD.   Pulmonary:      Effort: Pulmonary effort is normal.      Breath sounds: Normal breath sounds.   Cardiovascular:      Normal rate. Regular rhythm.      No gallop.    Pulses:     Intact distal pulses.   Edema:     Peripheral edema absent.   Skin:     General: Skin is warm and dry.   Neurological:      Mental Status: Alert and oriented to person, place, and time.         Result Review  Data Reviewed:  The following data was reviewed by: POORNIMA Hernandez on 11/20/2023  Lab Results   Component Value Date    WBC 9.94 10/13/2016    HGB 11.1 (L) 10/13/2016    HCT " 32.9 (L) 10/13/2016    MCV 87.7 10/13/2016     (L) 10/13/2016              ECG 12 Lead    Date/Time: 11/20/2023 11:55 AM  Performed by: Sade Hurd APRN    Authorized by: Sade Hurd APRN  Comparison: not compared with previous ECG   Previous ECG: no previous ECG available  Rhythm: sinus rhythm  Rate: normal  BPM: 81  Conduction comments: Possible RVCD    Clinical impression: non-specific ECG             Assessment and Plan   Problem List Items Addressed This Visit          Cardiac and Vasculature    Non-cardiac chest pain    Current Assessment & Plan     Discussed symptoms of angina, risk factor modification, and when to call. Encouraged gradual increase in routine activity. Check labs. Could check coronary calcium score to help guide therapy. Check lipid panel - if uncontrolled will consider statin for primary prevention given paternal family history of premature CAD.         Relevant Orders    Lipid Panel    Palpitations - Primary    Current Assessment & Plan     Discussed possible causes. Check labs. Will check extended Holter if increases in frequency or severity. Advised patient to call if any worsening. Encouraged hydration, good sleep hygiene, and avoid decongestants/stimulants as much as possible.         Relevant Orders    CBC Auto Differential    Comprehensive Metabolic Panel    Magnesium    TSH       Family History    Family history of premature CAD    Current Assessment & Plan     Encouraged risk factor modification. Check labs.         Relevant Orders    Lipid Panel       Gastrointestinal Abdominal     Current use of proton pump inhibitor    Current Assessment & Plan     Check Mg and chemistry to ensure no electrolyte abnormalities. May need to consider referral to GI for further evaluation. Encouraged her to see her PCP.            Patient was given instructions and counseling regarding her condition or for health maintenance advice. Please see specific information pulled  into the AVS if appropriate.    Follow Up :   Return if symptoms worsen or fail to improve.       Time Spent: I spent 45 minutes caring for Vani on this date of service. This time includes time spent by me in the following activities: performing a medically appropriate examination and/or evaluation, counseling and educating the patient/family/caregiver, ordering medications, tests, or procedures, and documenting information in the medical record.       Sade Hurd APRN, ACNP-BC, CHFN-BC

## 2023-11-20 NOTE — ASSESSMENT & PLAN NOTE
Discussed possible causes. Check labs. Will check extended Holter if increases in frequency or severity. Advised patient to call if any worsening. Encouraged hydration, good sleep hygiene, and avoid decongestants/stimulants as much as possible.

## 2023-11-20 NOTE — ASSESSMENT & PLAN NOTE
Discussed symptoms of angina, risk factor modification, and when to call. Encouraged gradual increase in routine activity. Check labs. Could check coronary calcium score to help guide therapy. Check lipid panel - if uncontrolled will consider statin for primary prevention given paternal family history of premature CAD.

## 2023-11-20 NOTE — ASSESSMENT & PLAN NOTE
Check Mg and chemistry to ensure no electrolyte abnormalities. May need to consider referral to GI for further evaluation. Encouraged her to see her PCP.

## 2023-11-21 ENCOUNTER — PATIENT ROUNDING (BHMG ONLY) (OUTPATIENT)
Dept: CARDIOLOGY | Facility: CLINIC | Age: 33
End: 2023-11-21
Payer: COMMERCIAL

## 2023-11-21 NOTE — PROGRESS NOTES
November 21, 2023    Hello, may I speak with Vani Akers?    My name is [facility], Practice Lead      I am  with [facility]  [address]  [place] [address] [phone].    Before we get started may I verify your date of birth? 1990    I am calling to officially welcome you to our practice and ask about your recent visit. Is this a good time to talk? yes    Tell me about your visit with us. What things went well?  Everyone was great        We're always looking for ways to make our patients' experiences even better. Do you have recommendations on ways we may improve?  no    Overall were you satisfied with your first visit to our practice? yes       I appreciate you taking the time to speak with me today. Is there anything else I can do for you? no      Thank you, and have a great day.

## 2023-11-22 ENCOUNTER — TELEPHONE (OUTPATIENT)
Dept: CARDIOLOGY | Facility: CLINIC | Age: 33
End: 2023-11-22
Payer: COMMERCIAL

## 2023-11-22 NOTE — TELEPHONE ENCOUNTER
Caller: Vani Akers    Relationship: Self    Best call back number: [phone]     What orders are you requesting (i.e. lab or imaging): LABS    In what timeframe would the patient need to come in: ASAP    Where will you receive your lab/imaging services: [facility]    Additional notes: PT STATES SHE ATTEMPTED TO GET LABS DONE TODAY AS SHE WAS TOLD THEY WERE BEING SENT BUT WHEN SHE TRIED THEY DO NOT HAVE ANY ORDERS, PT ASKING IF THEY CAN BE SENT TO DR [facility]ILLAL[facility] OFFICE FOR HER TO COMPLETE ASAP

## 2023-11-24 LAB
ALBUMIN SERPL-MCNC: 4.3 G/DL (ref 3.9–4.9)
ALBUMIN/GLOB SERPL: 1.6 {RATIO} (ref 1.2–2.2)
ALP SERPL-CCNC: 95 IU/L (ref 44–121)
ALT SERPL-CCNC: 16 IU/L (ref 0–32)
AST SERPL-CCNC: 18 IU/L (ref 0–40)
BASOPHILS # BLD AUTO: 0.1 X10E3/UL (ref 0–0.2)
BASOPHILS NFR BLD AUTO: 1 %
BILIRUB SERPL-MCNC: 0.3 MG/DL (ref 0–1.2)
BUN SERPL-MCNC: 10 MG/DL (ref 6–20)
BUN/CREAT SERPL: 14 (ref 9–23)
CALCIUM SERPL-MCNC: 9.3 MG/DL (ref 8.7–10.2)
CHLORIDE SERPL-SCNC: 102 MMOL/L (ref 96–106)
CHOLEST SERPL-MCNC: 224 MG/DL (ref 100–199)
CO2 SERPL-SCNC: 25 MMOL/L (ref 20–29)
CREAT SERPL-MCNC: 0.7 MG/DL (ref 0.57–1)
EGFRCR SERPLBLD CKD-EPI 2021: 118 ML/MIN/1.73
EOSINOPHIL # BLD AUTO: 0.1 X10E3/UL (ref 0–0.4)
EOSINOPHIL NFR BLD AUTO: 2 %
ERYTHROCYTE [DISTWIDTH] IN BLOOD BY AUTOMATED COUNT: 13.9 % (ref 11.7–15.4)
GLOBULIN SER CALC-MCNC: 2.7 G/DL (ref 1.5–4.5)
GLUCOSE SERPL-MCNC: 73 MG/DL (ref 70–99)
HCT VFR BLD AUTO: 37.3 % (ref 34–46.6)
HDLC SERPL-MCNC: 50 MG/DL
HGB BLD-MCNC: 12.4 G/DL (ref 11.1–15.9)
IMM GRANULOCYTES # BLD AUTO: 0 X10E3/UL (ref 0–0.1)
IMM GRANULOCYTES NFR BLD AUTO: 0 %
LDLC SERPL CALC-MCNC: 142 MG/DL (ref 0–99)
LYMPHOCYTES # BLD AUTO: 1.9 X10E3/UL (ref 0.7–3.1)
LYMPHOCYTES NFR BLD AUTO: 29 %
MAGNESIUM SERPL-MCNC: 2 MG/DL (ref 1.6–2.3)
MCH RBC QN AUTO: 27.1 PG (ref 26.6–33)
MCHC RBC AUTO-ENTMCNC: 33.2 G/DL (ref 31.5–35.7)
MCV RBC AUTO: 81 FL (ref 79–97)
MONOCYTES # BLD AUTO: 0.4 X10E3/UL (ref 0.1–0.9)
MONOCYTES NFR BLD AUTO: 6 %
NEUTROPHILS # BLD AUTO: 4.1 X10E3/UL (ref 1.4–7)
NEUTROPHILS NFR BLD AUTO: 62 %
PLATELET # BLD AUTO: 289 X10E3/UL (ref 150–450)
POTASSIUM SERPL-SCNC: 4.4 MMOL/L (ref 3.5–5.2)
PROT SERPL-MCNC: 7 G/DL (ref 6–8.5)
RBC # BLD AUTO: 4.58 X10E6/UL (ref 3.77–5.28)
SODIUM SERPL-SCNC: 140 MMOL/L (ref 134–144)
TRIGL SERPL-MCNC: 178 MG/DL (ref 0–149)
TSH SERPL DL<=0.005 MIU/L-ACNC: 2.13 UIU/ML (ref 0.45–4.5)
VLDLC SERPL CALC-MCNC: 32 MG/DL (ref 5–40)
WBC # BLD AUTO: 6.5 X10E3/UL (ref 3.4–10.8)

## 2023-11-27 DIAGNOSIS — E78.2 MIXED HYPERLIPIDEMIA: Primary | ICD-10-CM

## 2023-11-27 DIAGNOSIS — Z82.49 FAMILY HISTORY OF PREMATURE CAD: ICD-10-CM

## 2023-11-27 RX ORDER — ROSUVASTATIN CALCIUM 5 MG/1
5 TABLET, COATED ORAL NIGHTLY
Qty: 30 TABLET | Refills: 11 | Status: SHIPPED | OUTPATIENT
Start: 2023-11-27 | End: 2024-11-26

## 2023-11-27 NOTE — PROGRESS NOTES
Please notify patient her labs look good other than cholesterol is a little high. She can try healthy diet and exercise for 6 months and recheck or we can go ahead and put her on cholesterol medication given family history of premature CAD (whichever she prefers is reasonable). Also let me know if she wants to proceed with coronary calcium scoring. She is no longer anemic and all of her electrolytes are normal. Nothing to explain her occasional palpitations. TX!

## 2023-11-27 NOTE — PROGRESS NOTES
Ordered rosuvastatin 5 mg nightly. Repeat CMP and lipid panel in 6-8 weeks. Ordered coronary artery calcium scoring. I know [facility] and [facility] are offering for a flat price without insurance but check with P to see if they are/cost.

## 2023-11-27 NOTE — PROGRESS NOTES
Vani Akers notified and voiced understanding. Wants to start cholesterol medication and get calcium scoring ct at [facility]. Thanks

## 2024-01-17 ENCOUNTER — HOSPITAL ENCOUNTER (OUTPATIENT)
Dept: CT IMAGING | Facility: HOSPITAL | Age: 34
Discharge: HOME | End: 2024-01-17
Admitting: NURSE PRACTITIONER

## 2024-01-17 DIAGNOSIS — E78.2 MIXED HYPERLIPIDEMIA: ICD-10-CM

## 2024-01-17 DIAGNOSIS — Z82.49 FAMILY HISTORY OF PREMATURE CAD: ICD-10-CM

## 2024-01-17 PROCEDURE — 75571 CT HRT W/O DYE W/CA TEST: CPT

## 2025-02-26 ENCOUNTER — OFFICE VISIT (OUTPATIENT)
Dept: GASTROENTEROLOGY | Facility: CLINIC | Age: 35
End: 2025-02-26
Payer: COMMERCIAL

## 2025-02-26 VITALS
OXYGEN SATURATION: 97 % | TEMPERATURE: 97.8 F | DIASTOLIC BLOOD PRESSURE: 78 MMHG | WEIGHT: 143.4 LBS | SYSTOLIC BLOOD PRESSURE: 118 MMHG | HEART RATE: 85 BPM | HEIGHT: 62 IN | BODY MASS INDEX: 26.39 KG/M2

## 2025-02-26 DIAGNOSIS — R10.13 DYSPEPSIA: Primary | ICD-10-CM

## 2025-02-26 DIAGNOSIS — Z78.9 NONSMOKER: ICD-10-CM

## 2025-02-26 NOTE — PROGRESS NOTES
Vani Akers  1990  Chief Complaint   Patient presents with    GI Problem     gerd     Subjective   HPI  Vani Akers is a 34 y.o. female who presents with a GERD reflux indigestion. This began months ago moderate for her. Triggered by certain foods. No melena. No wt loss. No fever chills or sweats. No abdominal pain.   She was taking Prilosec for this. She says that she made dietary modifications started going to the gym and was able to discontinue medication. Her symptoms have now resolved. No dysphagia. No wt loss. No loss in appetite. She had an ultrasound at [facility] and I will get this for review. She says it was normal.   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 LABOR DELIVERY;  Service:     HYSTERECTOMY      INJECTION OF MEDICATION  10/13/2014    Kenalog(1)    INJECTION OF MEDICATION  2015    Zofran(1)       Outpatient Medications Marked as Taking for the 25 encounter (Office Visit) with Cari Ellis APRN   Medication Sig Dispense Refill    Acetaminophen (Tylenol) 325 MG capsule Take 650 mg by mouth  Daily As Needed (headache).      ibuprofen (ADVIL,MOTRIN) 200 MG tablet Take 1 tablet by mouth Every 6 (Six) Hours As Needed for Mild Pain.      loratadine (CLARITIN) 10 MG tablet Take 1 tablet by mouth Daily As Needed for Allergies.      omeprazole (priLOSEC) 20 MG capsule Take 1 capsule by mouth Daily As Needed.       No Known Allergies  Social History     Socioeconomic History    Marital status:    Tobacco Use    Smoking status: Never     Passive exposure: Past (from father in home and vehicle for years,  smokes but not in home or vehicle)    Smokeless tobacco: Never    Tobacco comments:     As a teenager - smoked less than 1 pack of cigarettes   Vaping Use    Vaping status: Never Used   Substance and Sexual Activity    Alcohol use: Not Currently     Comment: occasionally in her 20s - never heavy    Drug use: Never    Sexual activity: Yes     Partners: Male     Birth control/protection: Tubal ligation     Family History   Problem Relation Age of Onset    No Known Problems Mother     Hypertension Father     Heart disease Father 54        CABG x 3, heavy smoker    No Known Problems Sister     Heart disease Paternal Uncle         stents    Heart attack Maternal Grandfather         cause of death at unknown age    Heart disease Maternal Grandfather     Heart disease Paternal Grandmother     Diabetes Paternal Grandmother     Cirrhosis Paternal Grandmother         non-alcoholic    Heart disease Paternal Grandfather         CABG and redo    Breast cancer Other     Colon cancer Neg Hx     Endometrial cancer Neg Hx     Ovarian cancer Neg Hx     Colon polyps Neg Hx      Health Maintenance   Topic Date Due    BMI FOLLOWUP  Never done    ANNUAL PHYSICAL  Never done    PAP SMEAR  Never done    INFLUENZA VACCINE  Never done    COVID-19 Vaccine (1 - 2024-25 season) Never done    LIPID PANEL  11/21/2024    TDAP/TD VACCINES (2 - Td or Tdap) 10/14/2026    HEPATITIS C SCREENING  Completed    Pneumococcal Vaccine 0-49   "Aged Out     Review of Systems   Constitutional:  Negative for activity change, appetite change, chills, diaphoresis, fatigue, fever and unexpected weight change.   HENT:  Negative for ear pain, hearing loss, mouth sores, sore throat, trouble swallowing and voice change.    Eyes: Negative.    Respiratory:  Negative for cough, choking, shortness of breath and wheezing.    Cardiovascular:  Negative for chest pain and palpitations.   Gastrointestinal:  Negative for abdominal pain, blood in stool, constipation, diarrhea, nausea and vomiting.   Endocrine: Negative for cold intolerance and heat intolerance.   Genitourinary:  Negative for decreased urine volume, dysuria, frequency, hematuria and urgency.   Musculoskeletal:  Negative for back pain, gait problem and myalgias.   Skin:  Negative for color change, pallor and rash.   Allergic/Immunologic: Negative for food allergies and immunocompromised state.   Neurological:  Negative for dizziness, tremors, seizures, syncope, weakness, light-headedness, numbness and headaches.   Hematological:  Negative for adenopathy. Does not bruise/bleed easily.   Psychiatric/Behavioral:  Negative for agitation and confusion. The patient is not nervous/anxious.    All other systems reviewed and are negative.    Objective   Vitals:    02/26/25 0836   BP: 118/78   BP Location: Left arm   Pulse: 85   Temp: 97.8 °F (36.6 °C)   TempSrc: Temporal   SpO2: 97%   Weight: 65 kg (143 lb 6.4 oz)   Height: 157.5 cm (62.01\")     Body mass index is 26.22 kg/m².  Physical Exam  Constitutional:       Appearance: She is well-developed.   HENT:      Head: Normocephalic and atraumatic.   Eyes:      Pupils: Pupils are equal, round, and reactive to light.   Neck:      Trachea: No tracheal deviation.   Cardiovascular:      Rate and Rhythm: Normal rate and regular rhythm.      Heart sounds: Normal heart sounds. No murmur heard.     No friction rub. No gallop.   Pulmonary:      Effort: Pulmonary effort is normal. " No respiratory distress.      Breath sounds: Normal breath sounds. No wheezing or rales.   Chest:      Chest wall: No tenderness.   Abdominal:      General: Bowel sounds are normal. There is no distension.      Palpations: Abdomen is soft. Abdomen is not rigid.      Tenderness: There is no abdominal tenderness. There is no guarding or rebound.   Musculoskeletal:         General: No tenderness or deformity. Normal range of motion.      Cervical back: Normal range of motion and neck supple.   Skin:     General: Skin is warm and dry.      Coloration: Skin is not pale.      Findings: No rash.   Neurological:      Mental Status: She is alert and oriented to person, place, and time.      Deep Tendon Reflexes: Reflexes are normal and symmetric.   Psychiatric:         Behavior: Behavior normal.         Thought Content: Thought content normal.         Judgment: Judgment normal.       Assessment & Plan   Diagnoses and all orders for this visit:    1. Dyspepsia (Primary)    2. Nonsmoker    I discussed non pharmaceutical treatment of gerd.  This includes gradually losing weight to achieve ideal body wt., elevation of the head of bed by 4-6 inches, nothing to eat or drink 3 hours prior to lying down, avoiding tight clothing, stress reduction, tobacco cessation, reduction of alcohol intake, and dietary restrictions (avoiding caffeine, coffee, fatty foods, mints, chocolate, spicy foods and tomato based sauces as much as possible).    Can start Prilosec back if symptoms return. We did discuss endoscopy however we do not feel that this is warranted at this time given she has no symptoms and no longer taking medications. Aware to call if she desires endoscopy or if symptoms return. She agrees.     Part of this note may be an electronic transcription/translation of spoken language to printed text using the Dragon Dictation System.  Body mass index is 26.22 kg/m².  No follow-ups on file.    BMI is >= 25 and <30. (Overweight) The  following options were offered after discussion;: weight loss educational material (shared in after visit summary)      All risks, benefits, alternatives, and indications of colonoscopy and/or Endoscopy procedure have been discussed with the patient. Risks to include perforation of the colon requiring possible surgery or colostomy, risk of bleeding from biopsies or removal of colon tissue, possibility of missing a colon polyp or cancer, or adverse drug reaction.  Benefits to include the diagnosis and management of disease of the colon and rectum. Alternatives to include barium enema, radiographic evaluation, lab testing or no intervention. Pt verbalizes understanding and agrees.     Crai Ellis, APRN  2/26/2025  09:16 CST          If you smoke or use tobacco, 4 minutes reading provided  Steps to Quit Smoking  Smoking tobacco can be harmful to your health and can affect almost every organ in your body. Smoking puts you, and those around you, at risk for developing many serious chronic diseases. Quitting smoking is difficult, but it is one of the best things that you can do for your health. It is never too late to quit.  What are the benefits of quitting smoking?  When you quit smoking, you lower your risk of developing serious diseases and conditions, such as:  Lung cancer or lung disease, such as COPD.  Heart disease.  Stroke.  Heart attack.  Infertility.  Osteoporosis and bone fractures.  Additionally, symptoms such as coughing, wheezing, and shortness of breath may get better when you quit. You may also find that you get sick less often because your body is stronger at fighting off colds and infections. If you are pregnant, quitting smoking can help to reduce your chances of having a baby of low birth weight.  How do I get ready to quit?  When you decide to quit smoking, create a plan to make sure that you are successful. Before you quit:  Pick a date to quit. Set a date within the next two weeks to give  you time to prepare.  Write down the reasons why you are quitting. Keep this list in places where you will see it often, such as on your bathroom mirror or in your car or wallet.  Identify the people, places, things, and activities that make you want to smoke (triggers) and avoid them. Make sure to take these actions:  Throw away all cigarettes at home, at work, and in your car.  Throw away smoking accessories, such as ashtrays and lighters.  Clean your car and make sure to empty the ashtray.  Clean your home, including curtains and carpets.  Tell your family, friends, and coworkers that you are quitting. Support from your loved ones can make quitting easier.  Talk with your health care provider about your options for quitting smoking.  Find out what treatment options are covered by your health insurance.  What strategies can I use to quit smoking?  Talk with your healthcare provider about different strategies to quit smoking. Some strategies include:  Quitting smoking altogether instead of gradually lessening how much you smoke over a period of time. Research shows that quitting “cold turkey” is more successful than gradually quitting.  Attending in-person counseling to help you build problem-solving skills. You are more likely to have success in quitting if you attend several counseling sessions. Even short sessions of 10 minutes can be effective.  Finding resources and support systems that can help you to quit smoking and remain smoke-free after you quit. These resources are most helpful when you use them often. They can include:  Online chats with a counselor.  Telephone quitlines.  Printed self-help materials.  Support groups or group counseling.  Text messaging programs.  Mobile phone applications.  Taking medicines to help you quit smoking. (If you are pregnant or breastfeeding, talk with your health care provider first.) Some medicines contain nicotine and some do not. Both types of medicines help with  cravings, but the medicines that include nicotine help to relieve withdrawal symptoms. Your health care provider may recommend:  Nicotine patches, gum, or lozenges.  Nicotine inhalers or sprays.  Non-nicotine medicine that is taken by mouth.  Talk with your health care provider about combining strategies, such as taking medicines while you are also receiving in-person counseling. Using these two strategies together makes you more likely to succeed in quitting than if you used either strategy on its own.  If you are pregnant or breastfeeding, talk with your health care provider about finding counseling or other support strategies to quit smoking. Do not take medicine to help you quit smoking unless told to do so by your health care provider.  What things can I do to make it easier to quit?  Quitting smoking might feel overwhelming at first, but there is a lot that you can do to make it easier. Take these important actions:  Reach out to your family and friends and ask that they support and encourage you during this time. Call telephone quitlines, reach out to support groups, or work with a counselor for support.  Ask people who smoke to avoid smoking around you.  Avoid places that trigger you to smoke, such as bars, parties, or smoke-break areas at work.  Spend time around people who do not smoke.  Lessen stress in your life, because stress can be a smoking trigger for some people. To lessen stress, try:  Exercising regularly.  Deep-breathing exercises.  Yoga.  Meditating.  Performing a body scan. This involves closing your eyes, scanning your body from head to toe, and noticing which parts of your body are particularly tense. Purposefully relax the muscles in those areas.  Download or purchase mobile phone or tablet apps (applications) that can help you stick to your quit plan by providing reminders, tips, and encouragement. There are many free apps, such as QuitGuide from the CDC (Centers for Disease Control and  Prevention). You can find other support for quitting smoking (smoking cessation) through smokefree.gov and other websites.  How will I feel when I quit smoking?  Within the first 24 hours of quitting smoking, you may start to feel some withdrawal symptoms. These symptoms are usually most noticeable 2-3 days after quitting, but they usually do not last beyond 2-3 weeks. Changes or symptoms that you might experience include:  Mood swings.  Restlessness, anxiety, or irritation.  Difficulty concentrating.  Dizziness.  Strong cravings for sugary foods in addition to nicotine.  Mild weight gain.  Constipation.  Nausea.  Coughing or a sore throat.  Changes in how your medicines work in your body.  A depressed mood.  Difficulty sleeping (insomnia).  After the first 2-3 weeks of quitting, you may start to notice more positive results, such as:  Improved sense of smell and taste.  Decreased coughing and sore throat.  Slower heart rate.  Lower blood pressure.  Clearer skin.  The ability to breathe more easily.  Fewer sick days.  Quitting smoking is very challenging for most people. Do not get discouraged if you are not successful the first time. Some people need to make many attempts to quit before they achieve long-term success. Do your best to stick to your quit plan, and talk with your health care provider if you have any questions or concerns.  This information is not intended to replace advice given to you by your health care provider. Make sure you discuss any questions you have with your health care provider.  Document Released: 12/12/2002 Document Revised: 08/15/2017 Document Reviewed: 05/03/2016  [employer] Interactive Patient Education © 2017 [employer]